# Patient Record
Sex: MALE | URBAN - METROPOLITAN AREA
[De-identification: names, ages, dates, MRNs, and addresses within clinical notes are randomized per-mention and may not be internally consistent; named-entity substitution may affect disease eponyms.]

---

## 2018-08-15 ENCOUNTER — AMBULATORY SURGICAL CENTER (OUTPATIENT)
Dept: URBAN - METROPOLITAN AREA SURGERY 10 | Facility: SURGERY | Age: 43
End: 2018-08-15

## 2018-08-15 ENCOUNTER — OFFICE (OUTPATIENT)
Dept: URBAN - METROPOLITAN AREA PATHOLOGY 4 | Facility: PATHOLOGY | Age: 43
End: 2018-08-15

## 2018-08-15 DIAGNOSIS — K22.2 ESOPHAGEAL OBSTRUCTION: ICD-10-CM

## 2018-08-15 DIAGNOSIS — K21.0 GASTRO-ESOPHAGEAL REFLUX DISEASE WITH ESOPHAGITIS: ICD-10-CM

## 2018-08-15 DIAGNOSIS — R13.10 DYSPHAGIA, UNSPECIFIED: ICD-10-CM

## 2018-08-15 DIAGNOSIS — K29.70 GASTRITIS, UNSPECIFIED, WITHOUT BLEEDING: ICD-10-CM

## 2018-08-15 PROCEDURE — 43249 ESOPH EGD DILATION <30 MM: CPT | Performed by: INTERNAL MEDICINE

## 2018-08-15 PROCEDURE — 43239 EGD BIOPSY SINGLE/MULTIPLE: CPT | Mod: 59 | Performed by: INTERNAL MEDICINE

## 2018-08-15 PROCEDURE — 88305 TISSUE EXAM BY PATHOLOGIST: CPT | Performed by: INTERNAL MEDICINE

## 2025-01-29 LAB
BASOPHILS # BLD AUTO: 0.05 10*3/MM3 (ref 0–0.2)
BASOPHILS NFR BLD AUTO: 0.8 % (ref 0–1.5)
BILIRUB UR QL STRIP: NEGATIVE
CLARITY UR: CLEAR
COLOR UR: YELLOW
DEPRECATED RDW RBC AUTO: 42.5 FL (ref 37–54)
EOSINOPHIL # BLD AUTO: 0.13 10*3/MM3 (ref 0–0.4)
EOSINOPHIL NFR BLD AUTO: 2.1 % (ref 0.3–6.2)
ERYTHROCYTE [DISTWIDTH] IN BLOOD BY AUTOMATED COUNT: 12.4 % (ref 12.3–15.4)
GLUCOSE UR STRIP-MCNC: NEGATIVE MG/DL
HCT VFR BLD AUTO: 38.9 % (ref 37.5–51)
HGB BLD-MCNC: 13.3 G/DL (ref 13–17.7)
HGB UR QL STRIP.AUTO: NEGATIVE
IMM GRANULOCYTES # BLD AUTO: 0.02 10*3/MM3 (ref 0–0.05)
IMM GRANULOCYTES NFR BLD AUTO: 0.3 % (ref 0–0.5)
KETONES UR QL STRIP: NEGATIVE
LEUKOCYTE ESTERASE UR QL STRIP.AUTO: NEGATIVE
LYMPHOCYTES # BLD AUTO: 1.78 10*3/MM3 (ref 0.7–3.1)
LYMPHOCYTES NFR BLD AUTO: 28.4 % (ref 19.6–45.3)
MCH RBC QN AUTO: 32.4 PG (ref 26.6–33)
MCHC RBC AUTO-ENTMCNC: 34.2 G/DL (ref 31.5–35.7)
MCV RBC AUTO: 94.9 FL (ref 79–97)
MONOCYTES # BLD AUTO: 0.6 10*3/MM3 (ref 0.1–0.9)
MONOCYTES NFR BLD AUTO: 9.6 % (ref 5–12)
NEUTROPHILS NFR BLD AUTO: 3.68 10*3/MM3 (ref 1.7–7)
NEUTROPHILS NFR BLD AUTO: 58.8 % (ref 42.7–76)
NITRITE UR QL STRIP: NEGATIVE
NRBC BLD AUTO-RTO: 0 /100 WBC (ref 0–0.2)
PH UR STRIP.AUTO: 6 [PH] (ref 5–8)
PLATELET # BLD AUTO: 171 10*3/MM3 (ref 140–450)
PMV BLD AUTO: 10.1 FL (ref 6–12)
PROT UR QL STRIP: NEGATIVE
RBC # BLD AUTO: 4.1 10*6/MM3 (ref 4.14–5.8)
SP GR UR STRIP: <=1.005 (ref 1–1.03)
UROBILINOGEN UR QL STRIP: NORMAL
WBC NRBC COR # BLD AUTO: 6.26 10*3/MM3 (ref 3.4–10.8)

## 2025-01-29 PROCEDURE — 36415 COLL VENOUS BLD VENIPUNCTURE: CPT

## 2025-01-29 PROCEDURE — 80307 DRUG TEST PRSMV CHEM ANLYZR: CPT

## 2025-01-29 PROCEDURE — 82077 ASSAY SPEC XCP UR&BREATH IA: CPT

## 2025-01-29 PROCEDURE — 96365 THER/PROPH/DIAG IV INF INIT: CPT

## 2025-01-29 PROCEDURE — 99285 EMERGENCY DEPT VISIT HI MDM: CPT

## 2025-01-29 PROCEDURE — 80053 COMPREHEN METABOLIC PANEL: CPT

## 2025-01-29 PROCEDURE — 96375 TX/PRO/DX INJ NEW DRUG ADDON: CPT

## 2025-01-29 PROCEDURE — 81003 URINALYSIS AUTO W/O SCOPE: CPT

## 2025-01-29 PROCEDURE — 80074 ACUTE HEPATITIS PANEL: CPT | Performed by: PSYCHIATRY & NEUROLOGY

## 2025-01-29 PROCEDURE — 85025 COMPLETE CBC W/AUTO DIFF WBC: CPT

## 2025-01-30 ENCOUNTER — HOSPITAL ENCOUNTER (INPATIENT)
Facility: HOSPITAL | Age: 50
LOS: 3 days | Discharge: HOME | DRG: 881 | End: 2025-02-02
Attending: PSYCHIATRY & NEUROLOGY | Admitting: PSYCHIATRY & NEUROLOGY
Payer: COMMERCIAL

## 2025-01-30 ENCOUNTER — HOSPITAL ENCOUNTER (EMERGENCY)
Facility: HOSPITAL | Age: 50
Discharge: STILL ADMITTED OR ADMINISTRATIVE | DRG: 881 | End: 2025-01-30
Payer: COMMERCIAL

## 2025-01-30 VITALS
WEIGHT: 165 LBS | HEIGHT: 70 IN | HEART RATE: 94 BPM | TEMPERATURE: 97.9 F | OXYGEN SATURATION: 95 % | BODY MASS INDEX: 23.62 KG/M2 | DIASTOLIC BLOOD PRESSURE: 88 MMHG | SYSTOLIC BLOOD PRESSURE: 139 MMHG | RESPIRATION RATE: 18 BRPM

## 2025-01-30 DIAGNOSIS — F10.10 ALCOHOL ABUSE: Primary | ICD-10-CM

## 2025-01-30 PROBLEM — F10.20 ALCOHOL USE DISORDER, SEVERE, DEPENDENCE: Status: ACTIVE | Noted: 2025-01-30

## 2025-01-30 PROBLEM — F17.200 NICOTINE USE DISORDER: Status: ACTIVE | Noted: 2025-01-30

## 2025-01-30 PROBLEM — I10 HTN (HYPERTENSION): Status: ACTIVE | Noted: 2025-01-30

## 2025-01-30 LAB
ALBUMIN SERPL-MCNC: 4.6 G/DL (ref 3.5–5.2)
ALBUMIN/GLOB SERPL: 1.7 G/DL
ALP SERPL-CCNC: 64 U/L (ref 39–117)
ALT SERPL W P-5'-P-CCNC: 25 U/L (ref 1–41)
AMPHET+METHAMPHET UR QL: NEGATIVE
AMPHETAMINES UR QL: NEGATIVE
ANION GAP SERPL CALCULATED.3IONS-SCNC: 18.7 MMOL/L (ref 5–15)
AST SERPL-CCNC: 41 U/L (ref 1–40)
BARBITURATES UR QL SCN: NEGATIVE
BENZODIAZ UR QL SCN: NEGATIVE
BILIRUB SERPL-MCNC: 0.5 MG/DL (ref 0–1.2)
BUN SERPL-MCNC: 6 MG/DL (ref 6–20)
BUN/CREAT SERPL: 7 (ref 7–25)
BUPRENORPHINE SERPL-MCNC: NEGATIVE NG/ML
CALCIUM SPEC-SCNC: 9.3 MG/DL (ref 8.6–10.5)
CANNABINOIDS SERPL QL: NEGATIVE
CHLORIDE SERPL-SCNC: 94 MMOL/L (ref 98–107)
CO2 SERPL-SCNC: 21.3 MMOL/L (ref 22–29)
COCAINE UR QL: NEGATIVE
CREAT SERPL-MCNC: 0.86 MG/DL (ref 0.76–1.27)
EGFRCR SERPLBLD CKD-EPI 2021: 106.8 ML/MIN/1.73
ETHANOL BLD-MCNC: 120 MG/DL (ref 0–10)
ETHANOL BLD-MCNC: 227 MG/DL (ref 0–10)
ETHANOL BLD-MCNC: 88 MG/DL (ref 0–10)
ETHANOL UR QL: 0.09 %
ETHANOL UR QL: 0.12 %
ETHANOL UR QL: 0.23 %
FENTANYL UR-MCNC: NEGATIVE NG/ML
GEN 5 1HR TROPONIN T REFLEX: 7 NG/L
GLOBULIN UR ELPH-MCNC: 2.7 GM/DL
GLUCOSE SERPL-MCNC: 108 MG/DL (ref 65–99)
HAV IGM SERPL QL IA: NORMAL
HBV CORE IGM SERPL QL IA: NORMAL
HBV SURFACE AG SERPL QL IA: NORMAL
HCV AB SER QL: NORMAL
HOLD SPECIMEN: NORMAL
HOLD SPECIMEN: NORMAL
MAGNESIUM SERPL-MCNC: 2.2 MG/DL (ref 1.6–2.6)
METHADONE UR QL SCN: NEGATIVE
OPIATES UR QL: NEGATIVE
OXYCODONE UR QL SCN: NEGATIVE
PCP UR QL SCN: NEGATIVE
POTASSIUM SERPL-SCNC: 3.7 MMOL/L (ref 3.5–5.2)
PROT SERPL-MCNC: 7.3 G/DL (ref 6–8.5)
QT INTERVAL: 364 MS
QTC INTERVAL: 457 MS
SODIUM SERPL-SCNC: 134 MMOL/L (ref 136–145)
TRICYCLICS UR QL SCN: NEGATIVE
TROPONIN T NUMERIC DELTA: -2 NG/L
TROPONIN T SERPL HS-MCNC: 9 NG/L
WHOLE BLOOD HOLD COAG: NORMAL
WHOLE BLOOD HOLD SPECIMEN: NORMAL

## 2025-01-30 PROCEDURE — 93010 ELECTROCARDIOGRAM REPORT: CPT | Performed by: INTERNAL MEDICINE

## 2025-01-30 PROCEDURE — 82077 ASSAY SPEC XCP UR&BREATH IA: CPT | Performed by: NURSE PRACTITIONER

## 2025-01-30 PROCEDURE — 36415 COLL VENOUS BLD VENIPUNCTURE: CPT | Performed by: NURSE PRACTITIONER

## 2025-01-30 PROCEDURE — 63710000001 ONDANSETRON ODT 4 MG TABLET DISPERSIBLE: Performed by: PSYCHIATRY & NEUROLOGY

## 2025-01-30 PROCEDURE — 25010000002 THIAMINE HCL 200 MG/2ML SOLUTION: Performed by: NURSE PRACTITIONER

## 2025-01-30 PROCEDURE — 99222 1ST HOSP IP/OBS MODERATE 55: CPT | Performed by: PSYCHIATRY & NEUROLOGY

## 2025-01-30 PROCEDURE — 25810000003 SODIUM CHLORIDE 0.9 % SOLUTION: Performed by: NURSE PRACTITIONER

## 2025-01-30 PROCEDURE — 96375 TX/PRO/DX INJ NEW DRUG ADDON: CPT

## 2025-01-30 PROCEDURE — 83735 ASSAY OF MAGNESIUM: CPT

## 2025-01-30 PROCEDURE — 84484 ASSAY OF TROPONIN QUANT: CPT | Performed by: PSYCHIATRY & NEUROLOGY

## 2025-01-30 PROCEDURE — 96365 THER/PROPH/DIAG IV INF INIT: CPT

## 2025-01-30 PROCEDURE — 93005 ELECTROCARDIOGRAM TRACING: CPT | Performed by: PSYCHIATRY & NEUROLOGY

## 2025-01-30 RX ORDER — SODIUM CHLORIDE 9 MG/ML
1000 INJECTION, SOLUTION INTRAVENOUS ONCE
Status: COMPLETED | OUTPATIENT
Start: 2025-01-30 | End: 2025-01-30

## 2025-01-30 RX ORDER — IBUPROFEN 400 MG/1
400 TABLET, FILM COATED ORAL EVERY 6 HOURS PRN
Status: DISCONTINUED | OUTPATIENT
Start: 2025-01-30 | End: 2025-02-02 | Stop reason: HOSPADM

## 2025-01-30 RX ORDER — CHLORDIAZEPOXIDE HYDROCHLORIDE 10 MG/1
10 CAPSULE, GELATIN COATED ORAL ONCE
Status: COMPLETED | OUTPATIENT
Start: 2025-02-02 | End: 2025-02-02

## 2025-01-30 RX ORDER — BENZONATATE 100 MG/1
100 CAPSULE ORAL 3 TIMES DAILY PRN
Status: DISCONTINUED | OUTPATIENT
Start: 2025-01-30 | End: 2025-02-02 | Stop reason: HOSPADM

## 2025-01-30 RX ORDER — CHLORDIAZEPOXIDE HYDROCHLORIDE 25 MG/1
25 CAPSULE, GELATIN COATED ORAL 2 TIMES DAILY
Status: COMPLETED | OUTPATIENT
Start: 2025-01-31 | End: 2025-01-31

## 2025-01-30 RX ORDER — FAMOTIDINE 20 MG/1
20 TABLET, FILM COATED ORAL 2 TIMES DAILY PRN
Status: DISCONTINUED | OUTPATIENT
Start: 2025-01-30 | End: 2025-02-02 | Stop reason: HOSPADM

## 2025-01-30 RX ORDER — BENZTROPINE MESYLATE 1 MG/ML
1 INJECTION, SOLUTION INTRAMUSCULAR; INTRAVENOUS ONCE AS NEEDED
Status: DISCONTINUED | OUTPATIENT
Start: 2025-01-30 | End: 2025-02-02 | Stop reason: HOSPADM

## 2025-01-30 RX ORDER — CHLORDIAZEPOXIDE HYDROCHLORIDE 25 MG/1
50 CAPSULE, GELATIN COATED ORAL 3 TIMES DAILY PRN
Status: DISPENSED | OUTPATIENT
Start: 2025-01-30 | End: 2025-01-31

## 2025-01-30 RX ORDER — ECHINACEA PURPUREA EXTRACT 125 MG
2 TABLET ORAL AS NEEDED
Status: DISCONTINUED | OUTPATIENT
Start: 2025-01-30 | End: 2025-02-02 | Stop reason: HOSPADM

## 2025-01-30 RX ORDER — BENZTROPINE MESYLATE 1 MG/1
2 TABLET ORAL ONCE AS NEEDED
Status: DISCONTINUED | OUTPATIENT
Start: 2025-01-30 | End: 2025-02-02 | Stop reason: HOSPADM

## 2025-01-30 RX ORDER — SODIUM CHLORIDE 0.9 % (FLUSH) 0.9 %
10 SYRINGE (ML) INJECTION AS NEEDED
Status: DISCONTINUED | OUTPATIENT
Start: 2025-01-30 | End: 2025-01-30 | Stop reason: HOSPADM

## 2025-01-30 RX ORDER — CHLORDIAZEPOXIDE HYDROCHLORIDE 10 MG/1
10 CAPSULE, GELATIN COATED ORAL 2 TIMES DAILY
Status: COMPLETED | OUTPATIENT
Start: 2025-02-01 | End: 2025-02-01

## 2025-01-30 RX ORDER — ALUMINA, MAGNESIA, AND SIMETHICONE 2400; 2400; 240 MG/30ML; MG/30ML; MG/30ML
15 SUSPENSION ORAL EVERY 6 HOURS PRN
Status: DISCONTINUED | OUTPATIENT
Start: 2025-01-30 | End: 2025-02-02 | Stop reason: HOSPADM

## 2025-01-30 RX ORDER — CHLORDIAZEPOXIDE HYDROCHLORIDE 25 MG/1
25 CAPSULE, GELATIN COATED ORAL EVERY 8 HOURS SCHEDULED
Status: COMPLETED | OUTPATIENT
Start: 2025-01-30 | End: 2025-01-30

## 2025-01-30 RX ORDER — ONDANSETRON 4 MG/1
4 TABLET, ORALLY DISINTEGRATING ORAL EVERY 6 HOURS PRN
Status: DISCONTINUED | OUTPATIENT
Start: 2025-01-30 | End: 2025-02-02 | Stop reason: HOSPADM

## 2025-01-30 RX ORDER — POLYETHYLENE GLYCOL 3350 17 G/17G
17 POWDER, FOR SOLUTION ORAL DAILY PRN
Status: DISCONTINUED | OUTPATIENT
Start: 2025-01-30 | End: 2025-02-02 | Stop reason: HOSPADM

## 2025-01-30 RX ORDER — TRAZODONE HYDROCHLORIDE 50 MG/1
50 TABLET, FILM COATED ORAL NIGHTLY PRN
Status: DISCONTINUED | OUTPATIENT
Start: 2025-01-30 | End: 2025-02-02 | Stop reason: HOSPADM

## 2025-01-30 RX ORDER — THIAMINE HYDROCHLORIDE 100 MG/ML
200 INJECTION, SOLUTION INTRAMUSCULAR; INTRAVENOUS ONCE
Status: COMPLETED | OUTPATIENT
Start: 2025-01-30 | End: 2025-01-30

## 2025-01-30 RX ORDER — CHLORDIAZEPOXIDE HYDROCHLORIDE 25 MG/1
50 CAPSULE, GELATIN COATED ORAL ONCE
Status: COMPLETED | OUTPATIENT
Start: 2025-01-30 | End: 2025-01-30

## 2025-01-30 RX ORDER — LOPERAMIDE HYDROCHLORIDE 2 MG/1
2 CAPSULE ORAL
Status: DISCONTINUED | OUTPATIENT
Start: 2025-01-30 | End: 2025-02-02 | Stop reason: HOSPADM

## 2025-01-30 RX ORDER — MULTIPLE VITAMINS W/ MINERALS TAB 9MG-400MCG
1 TAB ORAL DAILY
Status: DISCONTINUED | OUTPATIENT
Start: 2025-01-30 | End: 2025-02-02 | Stop reason: HOSPADM

## 2025-01-30 RX ORDER — MULTIVITAMIN WITH IRON
2 TABLET ORAL DAILY
Status: DISCONTINUED | OUTPATIENT
Start: 2025-01-30 | End: 2025-02-02 | Stop reason: HOSPADM

## 2025-01-30 RX ORDER — HYDROXYZINE HYDROCHLORIDE 50 MG/1
50 TABLET, FILM COATED ORAL EVERY 6 HOURS PRN
Status: DISCONTINUED | OUTPATIENT
Start: 2025-01-30 | End: 2025-02-02 | Stop reason: HOSPADM

## 2025-01-30 RX ADMIN — CHLORDIAZEPOXIDE HYDROCHLORIDE 25 MG: 25 CAPSULE ORAL at 15:25

## 2025-01-30 RX ADMIN — CHLORDIAZEPOXIDE HYDROCHLORIDE 25 MG: 25 CAPSULE ORAL at 09:36

## 2025-01-30 RX ADMIN — SODIUM CHLORIDE 1000 ML: 9 INJECTION, SOLUTION INTRAVENOUS at 01:04

## 2025-01-30 RX ADMIN — CHLORDIAZEPOXIDE HYDROCHLORIDE 50 MG: 25 CAPSULE ORAL at 13:58

## 2025-01-30 RX ADMIN — HYDROXYZINE HYDROCHLORIDE 50 MG: 50 TABLET, FILM COATED ORAL at 09:36

## 2025-01-30 RX ADMIN — FOLIC ACID 1 MG: 5 INJECTION, SOLUTION INTRAMUSCULAR; INTRAVENOUS; SUBCUTANEOUS at 01:59

## 2025-01-30 RX ADMIN — THIAMINE HYDROCHLORIDE 200 MG: 100 INJECTION, SOLUTION INTRAMUSCULAR; INTRAVENOUS at 01:05

## 2025-01-30 RX ADMIN — ONDANSETRON 4 MG: 4 TABLET, ORALLY DISINTEGRATING ORAL at 09:36

## 2025-01-30 RX ADMIN — CHLORDIAZEPOXIDE HYDROCHLORIDE 25 MG: 25 CAPSULE ORAL at 21:17

## 2025-01-30 RX ADMIN — CHLORDIAZEPOXIDE HYDROCHLORIDE 50 MG: 25 CAPSULE ORAL at 05:10

## 2025-01-30 RX ADMIN — HYDROXYZINE HYDROCHLORIDE 50 MG: 50 TABLET, FILM COATED ORAL at 21:17

## 2025-01-30 NOTE — ED NOTES
MEDICAL SCREENING:    Reason for Visit: ETOH detox    Patient initially seen in triage.  The patient was advised further evaluation and diagnostic testing will be needed, some of the treatment and testing will be initiated in the lobby in order to begin the process.  The patient will be returned to the waiting area for the time being and possibly be re-assessed by a subsequent ED provider.  The patient will be brought back to the treatment area in as timely manner as possible.      Shaila Cerda APRN  01/29/25 [id number]       Aleksey Mcintyre MD  01/30/25 5815

## 2025-01-30 NOTE — PLAN OF CARE
Goal Outcome Evaluation:        Problem: Adult Behavioral Health Plan of Care  Goal: Patient-Specific Goal (Individualization)  Outcome: Progressing  Flowsheets  Taken 1/30/2025 0956  Patient/Family-Specific Goals (Include Timeframe): Patient will identify 2-3 coping skills, complete aftercare plans, address relapse prevention methods, and deny SI/HI prior to discharge in 1-7 days. Patient's long term goal is to maintain sobriety for the next 30 days.  Individualized Care Needs: Therapist to offer 1-4 therapy sessions, aftercare planning, safety planning, group therapy, family education, and brief CBT/MI interventions.  Anxieties, Fears or Concerns: none verbalized  Taken 1/30/2025 0947  Patient Personal Strengths:   resilient   resourceful   motivated for treatment   motivated for recovery   family/social support   socioeconomic stability   positive vocational history   positive educational history   stable living environment   relationship stability  Patient Vulnerabilities:   substance abuse/addiction   poor impulse control   history of unsuccessful treatment  Goal: Optimized Coping Skills in Response to Life Stressors  Outcome: Progressing  Intervention: Promote Effective Coping Strategies  Flowsheets (Taken 1/30/2025 0956)  Supportive Measures:   active listening utilized   counseling provided   decision-making supported   goal-setting facilitated   verbalization of feelings encouraged   self-responsibility promoted   self-reflection promoted   self-care encouraged   relaxation techniques promoted   positive reinforcement provided  Goal: Develops/Participates in Therapeutic [place] to Support Successful Transition  Outcome: Progressing  Intervention: Foster Therapeutic [place]den  Flowsheets (Taken 1/30/2025 0956)  Trust Relationship/Rapport:   care explained   questions encouraged   choices provided   reassurance provided   emotional support provided   thoughts/feelings acknowledged   empathic listening  provided   questions answered  Intervention: Mutually Develop Transition Plan  Flowsheets  Taken 1/30/2025 0958  Outpatient/Agency/Support Group Needs:   residential services   outpatient substance abuse treatment (specify)   outpatient psychiatric care (specify)   outpatient medication management   outpatient counseling   intensive outpatient services  Transition Support:   follow-up care discussed   follow-up care coordinated   community resources reviewed   crisis management plan promoted   crisis management plan verbalized  Anticipated Discharge Disposition: home with family  Taken 1/30/2025 0949  Discharge Coordination/Progress: Patient has [employer] insurance. Therapist met with patient to complete assessment.  Transportation Anticipated: family or friend will provide  Transportation Concerns: none  Current Discharge Risk: substance use/abuse  Concerns to be Addressed:   substance/tobacco abuse/use   coping/stress   cognitive/perceptual   mental health   discharge planning  Readmission Within the Last 30 Days: no previous admission in last 30 days  Patient/Family Anticipated Services at Transition:   outpatient care   mental health services  Patient's Choice of Community Agency(s): To be determined.  Patient/Family Anticipates Transition to: home  Offered/Gave Vendor List: yes      DATA:      Therapist met individually with patient this date to introduce role and to discuss hospitalization expectations. Patient agreeable.     Consent in chartlet for girlfriend and sons.    Patient not agreeable to aftercare, worried about cost of services and copays.     Clinical Maneuvering/Intervention:     Therapist assisted patient in processing session content; acknowledged and normalized patient’s thoughts, feelings, and concerns. Discussed the therapist/patient relationship and explain the parameters and limitations of relative confidentiality. Also discussed the importance of active participation, and honesty to the  treatment process. Encouraged the patient to discuss/vent their feelings, frustrations, and fears concerning their ongoing medical issues and validated their feelings.     Discussed the importance of finding enjoyable activities and coping skills that the patient can engage in a regular basis. Discussed healthy coping skills such as distraction, self love, grounding, thought challenges/reframing, etc. Provided patient with list of healthy coping skills this date. Discussed the importance of medication compliance. Praised the patient for seeking help and spent the majority of the session building rapport.       Allowed patient to freely discuss issues without interruption or judgment. Provided safe, confidential environment to facilitate the development of positive therapeutic relationship and encourage open, honest communication.      Therapist addressed discharge safety planning this date. Assisted patient in identifying risk factors which would indicate the need for higher level of care after discharge; including thoughts to harm self or others and/or self-harming behavior. Encouraged patient to call 911, or present to the nearest emergency room should any of these events occur. Discussed crisis intervention services and means to access. Encouraged securing any objects of harm.       Therapist completed integrated summary, treatment plan, and initiated social history this date. Therapist is strongly encouraging family involvement in treatment.       ASSESSMENT:      The patient is a 48 y/o male admitted for alcohol detox treatment. Therapist met with patient for approximately 30 minutes on this date to complete assessment. Patient denies SI/HI/AVH, reports experiencing tremors and fatigue. Patient has had no past [facility] admissions. Patient reports drinking did not become a problem for him until about age 41, longest period of sobriety has been two weeks. Patient denies history of physical, emotional, or  sexual abuse. Patient lives at home with his girlfriend and works full time in IT. He plans to return home when stable and is not agreeable to aftercare being arranged at this time. Patient discussed problems with his ex wife and stress from work as primary stressors. He reports his ex wife moved with two of their children to [place] and he has not been able to see them in a long time, still regularly sees one of his sons who is 25. Patient is agreeable to his girlfriend and songs being involved in his treatment. Family expected to provide transportation home at discharge. He denies having any additional needs or concerns.      PLAN:       Patient to remain hospitalized this date.     Treatment team will focus efforts on stabilizing patient's acute symptoms while providing education on healthy coping and crisis management to reduce hospitalizations. Patient requires daily psychiatrist evaluation and 24/7 nursing supervision to promote patient safety.     Therapist will offer 1-4 individual sessions, 1 therapy group daily, family education, and appropriate referral.    Therapist recommends [facility] residential rehab.

## 2025-01-30 NOTE — NURSING NOTE
Patient reports to intake and reports that he has been a drinker for many years since the age of 41 but has been drinking steadily for 3 yrs. He relates his drinking problem to stress and anxiety and issues with an ex wife to his reason for starting drinking and states that he has bad anxiety issues and now his drinking has just gotten out of hand and he needs to quit before it ruins his life. He states he has a girlfriend of two yrs and a job where he has been at 12 yrs and family and does not want to loose all that. And wants to quit drinking.     Last drink was yesterday. He states that he got home from work and called his girlfriend and told her he was tired of feeling this way.sick is he does not get a drink.         Initial etoh level  at 2323 was 227     Last etoh level was 88 at 0609..     Patient reports drinking 6 to 12 pints of beer aday and some times a shot of whiskey.    CIWA 8. Patient received librium 50 mg po at 0550 this am. He states that he was feeling pretty shaky before they gave him the medicine.     Patient denies SI HI or AVH    He denies any previous tx history for his addiction problem.

## 2025-01-30 NOTE — ED PROVIDER NOTES
Subjective   History of Present Illness  Patient is a 49-year-old male with past medical history significant for anxiety, depression, hypertension and alcohol abuse.  He presents to the ED today for alcohol detox.  He states that he drinks 12 nights of liquor and of beer daily.  Denies SI or HI.        Review of Systems   Constitutional: Negative.  Negative for fever.   HENT: Negative.     Eyes: Negative.    Respiratory: Negative.     Cardiovascular: Negative.  Negative for chest pain.   Gastrointestinal: Negative.  Negative for abdominal pain.   Endocrine: Negative.    Genitourinary: Negative.  Negative for dysuria.   Skin: Negative.    Allergic/Immunologic: Negative.    Neurological: Negative.    Psychiatric/Behavioral: Negative.     All other systems reviewed and are negative.      Past Medical History:   Diagnosis Date    Alcohol abuse     Anxiety     HLD (hyperlipidemia)     HTN (hypertension)        No Known Allergies    Past Surgical History:   Procedure Laterality Date    HERNIA REPAIR         No family history on file.    Social History     Socioeconomic History    Marital status: Single   Tobacco Use    Smoking status: Every Day     Average packs/day: 1 pack/day for 30.0 years (30.0 ttl pk-yrs)     Types: Cigarettes    Smokeless tobacco: Current     Types: Snuff   Vaping Use    Vaping status: Some Days    Substances: Nicotine, Flavoring    Devices: Disposable   Substance and Sexual Activity    Alcohol use: Yes    Drug use: Yes     Comment: etoh    Sexual activity: Yes     Partners: Female     Birth control/protection: None           Objective   Physical Exam  Vitals and nursing note reviewed.   Constitutional:       General: He is not in acute distress.     Appearance: He is well-developed. He is not diaphoretic.   HENT:      Head: Normocephalic and atraumatic.      Right Ear: External ear normal.      Left Ear: External ear normal.      Nose: Nose normal.   Eyes:      Conjunctiva/sclera: Conjunctivae  normal.      Pupils: Pupils are equal, round, and reactive to light.   Neck:      Vascular: No JVD.      Trachea: No tracheal deviation.   Cardiovascular:      Rate and Rhythm: Normal rate and regular rhythm.      Heart sounds: Normal heart sounds. No murmur heard.  Pulmonary:      Effort: Pulmonary effort is normal. No respiratory distress.      Breath sounds: Normal breath sounds. No wheezing.   Abdominal:      General: Bowel sounds are normal.      Palpations: Abdomen is soft.      Tenderness: There is no abdominal tenderness.   Musculoskeletal:         General: No deformity. Normal range of motion.      Cervical back: Normal range of motion and neck supple.   Skin:     General: Skin is warm and dry.      Coloration: Skin is not pale.      Findings: No erythema or rash.   Neurological:      Mental Status: He is alert and oriented to person, place, and time.      Cranial Nerves: No cranial nerve deficit.   Psychiatric:         Behavior: Behavior normal.         Thought Content: Thought content normal.         Procedures           ED Course                                                       Medical Decision Making  Problems Addressed:  Alcohol abuse: complicated acute illness or injury    Amount and/or Complexity of Data Reviewed  Labs: ordered.    Risk  Prescription drug management.        Final diagnoses:   Alcohol abuse       ED Disposition  ED Disposition       ED Disposition   DC/Transfer to Behavioral Health Condition   Stable    Comment   --               No follow-up provider specified.       Medication List      No changes were made to your prescriptions during this visit.            Shaila Cerda APRN  02/03/25 0109       Shaila Cerda APRN  02/03/25 0109

## 2025-01-30 NOTE — NURSING NOTE
Spoke to  via phone. Intake information provided. Instructed to admit the patient. And do the librium detox for now. Admit orders received. RBVOx2.. Patient and ed provider made aware of plan of care. Safety precautions maintained.

## 2025-01-30 NOTE — PAYOR COMM NOTE
"Reji Mooney (49 y.o. Male)       Date of Birth   1975    Social Security Number       Address   [address] [place] [address]    Home Phone   [phone]    MRN   [id number]       Voodoo   None    Marital Status   Single                            Admission Date   1/30/25    Admission Type   Emergency    Admitting Provider   Melanie Sanchez MD    Attending Provider   Melanie Sanchez MD    Department, Room/Bed   [facility] ADULT CD, 1044/1S       Discharge Date       Discharge Disposition       Discharge Destination                                 Attending Provider: Melanie Sanchez MD    Allergies: No Known Allergies    Isolation: None   Infection: None   Code Status: CPR    Ht: 177.8 cm (70\")   Wt: 74.1 kg (163 lb 6.4 oz)    Admission Cmt: None   Principal Problem: Alcohol abuse [F10.10]                   Active Insurance as of 1/30/2025       Primary Coverage       Payor Plan Insurance Group Employer/Plan Group    [facility] [id number]       Payor Plan Address Payor Plan Phone Number Payor Plan Fax Number Effective Dates    [address] [address] [phone]  1/31/2022 - None Entered    [facility] [id number]         Subscriber Name Subscriber Birth Date Member ID       REJI MOONEY 1975 [id number]                     Emergency Contacts        (Rel.) Home Phone Work Phone Mobile Phone    PEGGY REYES (Significant Other) [phone] -- --          RETURN FAX:  [phone]    PLEASE ATTACH THIS NEW INPATIENT BEHAVIORAL HEALTH DETOX (ASAM 4.0)  (REV CODE 126)  AUTHORIZATION REQUEST TO REFERENCE # [id number]    ADMISSION DATE:  1/30/2025 AT 0808 ET (ADMITTED FROM THE EMERGENCY DEPARTMENT)    DIAGNOSIS:  ALCOHOL USE DISORDER, SEVERE, DEPENDENCE (F10.20)  ESTIMATED LENGTH OF STAY IS 5 DAYS    FACILITY:  [facility] (NPI [id number])  ADDRESS:  [address] [place] [address]  ATTENDING MD:  DR MELANIE SANCHEZ (NPI [id number])  ADDRESS IS SAME AS " FACILITY    UR CONTACT:  TERESA YAÑEZ RN  PHONE:  [phone]  FAX:  [phone]       TOX, BLOOD    Ethanol %   0.227  0.120 0.088   Ethanol %     Ethanol   227  120 88   Ethanol     TOX, URINE    Amphetamine, Urine Qual  Negative       Amphetamine, Urine Qual     Barbiturates Screen, Urine  Negative       Barbiturates Screen, Urine     Benzodiazepine Screen, Urine  Negative       Benzodiazepine Screen, Urine     Buprenorphine, Screen, Urine  Negative       Buprenorphine, Screen, Urine     Cocaine Screen, Urine  Negative       Cocaine Screen, Urine     Fentanyl, Urine  Negative       Fentanyl, Urine     Methamphetamine, Ur  Negative       Methamphetamine, Ur     Methadone Screen , Urine  Negative       Methadone Screen , Urine     Opiate Screen  Negative       Opiate Screen     Oxycodone Screen, Urine  Negative       Oxycodone Screen, Urine     Phencyclidine (PCP), Urine  Negative       Phencyclidine (PCP), Urine     THC Screen, Urine  Negative       THC Screen, Urine     Tricyclic Antidepressants Screen  Negative       Tricyclic Antidepressants Screen     SENDOUT GENERAL LAB      Fentanyl, Urine  Negative     AST (SGOT)   41     Row Name 01/30/25 0215 01/30/25 0200 01/30/25 0145 01/30/25 0130 01/29/25 2226   Vital Signs   Temp -- -- -- -- 98.5 °F (36.9 °C)   Temp src -- -- -- -- Oral   Pulse 82 78 73 80 94   Heart Rate Source -- -- -- -- Monitor   Resp -- -- -- -- 18   Resp Rate Source -- -- -- -- Visual   /82 129/89 122/83 134/83 153/98     Row Name 01/30/25 0852 01/30/25 0818 01/30/25 0720 01/30/25 0630 01/30/25 0600   Vital Signs   Temp -- 98.4 °F (36.9 °C) 97.9 °F (36.6 °C) -- --   Temp src -- Temporal Temporal -- --   Pulse -- 87 94 75 75   Heart Rate Source -- Monitor Monitor -- --   Resp -- 18 -- -- --   Resp Rate Source -- Visual -- -- --   BP -- 151/92 139/88 133/89 127/84   Noninvasive MAP (mmHg) -- -- -- 108 102     Medical History    Past Medical History    Diagnosis Date Comments   HTN  "(hypertension) [I10]     Alcohol abuse [F10.10]     Anxiety [F41.9]     HLD (hyperlipidemia) [E78.5]       Past Surgical History   Laterality Date Comments   Hernia repair [SHX51]        ED Medication Orders    Hide(From admission, onward)  Start Ordered   Status Ordering Provider   01/30/25 0519 01/30/25 0503  chlordiazePOXIDE (LIBRIUM) capsule 50 mg  Once         Last MAR action: Given BARBARA VUN   01/30/25 0019 01/30/25 0003  sodium chloride 0.9 % infusion 1,000 mL  (Banana Bag (thiamine 200 mg, folic acid 1 mg in normal saline 1000 mL)  Once         Last MAR action: Stopped BARBARA VUN   01/30/25 0019 01/30/25 0003  folic acid 1 mg in sodium chloride 0.9 % 50 mL IVPB  (Banana Bag (thiamine 200 mg, folic acid 1 mg in normal saline 1000 mL)  Once         Last MAR action: Stopped HORACE MONICA   01/30/25 0019 01/30/25 0003  thiamine (B-1) injection 200 mg  (Banana Bag (thiamine 200 mg, folic acid 1 mg in normal saline 1000 mL)  Once         Last MAR action: Given BARBARA VUN   01/30/25 0002 01/30/25 0003    As Needed        \"And\" Linked Group Details    Discontinued      Current Scheduled Medications  Collapse  Hide  (From now, onward)  Start   Ordered Stop   02/02/25 0900  chlordiazePOXIDE (LIBRIUM) capsule 10 mg  10 mg,   Oral,   Once        References:    [facility]omp    Pediatrics    01/30/25 0740 --   02/01/25 0900  chlordiazePOXIDE (LIBRIUM) capsule 10 mg  10 mg,   Oral,   2 Times Daily        References:    [facility]    Pediatrics    01/30/25 0740 02/02/25 0859   01/31/25 0900  chlordiazePOXIDE (LIBRIUM) capsule 25 mg  25 mg,   Oral,   2 Times Daily        References:    [facility]    Pediatrics    01/30/25 0740 02/01/25 0859   01/30/25 0930  B-complex with vitamin C tablet 2 tablet  2 tablet,   Oral,   Daily        References:    [facility]    Pediatrics    01/30/25 0832 --   01/30/25 0930  thiamine (VITAMIN B-1) tablet 100 mg  100 mg,   Oral,   Daily        References:    [facility]    Pediatrics    01/30/25 0832 " --   25  multivitamin with minerals 1 tablet  1 tablet,   Oral,   Daily        References:    [facility]    Pediatrics    25 --   25  chlordiazePOXIDE (LIBRIUM) capsule 25 mg  25 mg,   Oral,   Every 8 Hours Scheduled        References:    Lexicomp    Pediatrics             PRN MEDICATIONS UTILIZED:  hydrOXYzine (ATARAX) tablet 50 mg  Dose: 50 mg  Freq: Every 6 Hours PRN Route: PO  PRN Reason: Anxiety  Start: 2532  RECEIVED 25 AT 0936  ondansetron ODT (ZOFRAN-ODT) disintegrating tablet 4 mg  Dose: 4 mg  Freq: Every 6 Hours PRN Route: TL  PRN Reasons: Nausea,Vomiting  Start: 25  RECEIVED 25 AT 0936    H&P:   Melanie Miller MD   Physician  Psychiatry     H&P      Signed     Date of Service: 25 115  Creation Time: 25     Signed       Expand All Collapse All            INITIAL PSYCHIATRIC HISTORY & PHYSICAL     Patient Identification:  Name:  Ron Durbin  Age:  49 y.o.  Sex:  male  :  1975  MRN:  [id number]   Visit Number:  [id number]  Primary Care Physician:  Kristi Conti APRN     SUBJECTIVE     CC/Focus of Exam: Alcohol use and withdrawals     HPI: Ron Durbin is a 49 y.o. male who was admitted on 2025 with complaints of alcohol use and withdrawals. The patient reports a long history of substance use. First use was at age 23 but didn't regularly and at age 41 he started drinking regularly to deal with the stressors of life. Over time the use increased and the patient  continued to use despite negative consequences including relationship problems, social and financial problems. The patient endorses symptoms of tolerance and withdrawals and ongoing cravings to use. Has tried to cut down and stop but has not been successful. Spends too much time and resources in pursuit of substance use. Longest period of sobriety is reported to be a week.  Currently using 6-10 pints of beer daily and then some more.   Last use was yesterday.    Withdrawal symptoms include tremors, sweating, anxiety attacks. The patient was experiencing a lot of withdrawals in the ED and needed a dose of Librium which helped.      PAST PSYCHIATRIC HX: Patient reports he took Wellbutrin recently but it didn't help.      SUBSTANCE USE HX: See HPI.      SOCIAL HX:   Social History   Social History            Socioeconomic History    Marital status: Single   Tobacco Use    Smoking status: Every Day       Average packs/day: 1 pack/day for 30.0 years (30.0 ttl pk-yrs)       Types: Cigarettes    Smokeless tobacco: Current       Types: Snuff   Vaping Use    Vaping status: Some Days    Substances: Nicotine, Flavoring    Devices: Disposable   Substance and Sexual Activity    Alcohol use: Yes    Drug use: Yes       Comment: etoh    Sexual activity: Yes       Partners: Female       Birth control/protection: None               Medical History        Past Medical History:   Diagnosis Date    Alcohol abuse      Anxiety      HLD (hyperlipidemia)      HTN (hypertension)                 Surgical History[]Expand by Default         Past Surgical History:   Procedure Laterality Date    HERNIA REPAIR                History reviewed. No pertinent family history.        Prescriptions Prior to Admission   No medications prior to admission.               ALLERGIES:  Patient has no known allergies.     Temp:  [97.9 °F (36.6 °C)-98.5 °F (36.9 °C)] 98.4 °F (36.9 °C)  Heart Rate:  [73-94] 87  Resp:  [18] 18  BP: (120-153)/(80-98) 151/92     REVIEW OF SYSTEMS:  Review of Systems   Constitutional:  Positive for diaphoresis and fatigue.   HENT: Negative.     Eyes: Negative.    Respiratory: Negative.     Cardiovascular: Negative.    Gastrointestinal:  Positive for nausea.   Endocrine: Negative.    Genitourinary: Negative.    Musculoskeletal: Negative.    Skin: Negative.    Allergic/Immunologic: Negative.    Neurological:  Positive for tremors and weakness.   Hematological: Negative.     Psychiatric/Behavioral:  The patient is nervous/anxious.          OBJECTIVE    PHYSICAL EXAM:  Physical Exam  Constitutional:  Appears well-developed and well-nourished.   HENT:   Head: Normocephalic and atraumatic.   Right Ear: External ear normal.   Left Ear: External ear normal.   Mouth/Throat: Oropharynx is clear and moist.   Eyes: Pupils are equal, round, and reactive to light. Conjunctivae and EOM are normal.   Neck: Normal range of motion. Neck supple.   Cardiovascular: Normal rate, regular rhythm and normal heart sounds.    Respiratory: Effort normal and breath sounds normal. No respiratory distress. No wheezes.   GI: Soft. Bowel sounds are normal.No distension. There is no tenderness.   Musculoskeletal: Normal range of motion. No edema or deformity.   Neurological:  Cranial Nerves: I. No anosmia. II: No visual disturbance. III, IV VI: EOMI, PERRLA. V: Corneal reflext intact, no abnormal sensations. VII: No facial palsy, or altered sensation. VIII: Hearing intact, balance intact. IX: Intact ah reflex. X: Normal phonation, swallowing. XI: Normal shrug and head movement. XII: Intact tongue movements  Coordination normal. No lateralizing signs.  Skin: Skin is warm and dry. No rash noted. No erythema.      MENTAL STATUS EXAM:   Hygiene:   fair  Cooperation:  Cooperative  Eye Contact:  Fair  Psychomotor Behavior:  Appropriate  Affect:  Appropriate  Hopelessness: Denies  Speech:  Normal  Thought Process: Goal directed  Thought Content:  Normal  Suicidal:  None  Homicidal:  None  Hallucinations:  None  Delusion:  None  Memory:  Intact  Orientation:  Person, Place, Time and Situation  Reliability:  fair  Insight:  Fair  Judgement:  Fair  Impulse Control:  Fair        Imaging Results (Last 24 Hours)         ** No results found for the last 24 hours. **                ECG/EMG Results (most recent)         Procedure Component Value Units Date/Time     ECG 12 Lead Other; Baseline Cardiac Status [[id number]] Collected:  01/30/25 1003       Updated: 01/30/25 1007       QT Interval 364 ms         QTC Interval 457 ms       Narrative:       Test Reason : Other~  Blood Pressure :   */*   mmHG  Vent. Rate :  95 BPM     Atrial Rate :  95 BPM     P-R Int : 142 ms          QRS Dur :  82 ms      QT Int : 364 ms       P-R-T Axes :  72  69  60 degrees    QTcB Int : 457 ms     Normal sinus rhythm  Normal ECG  No previous ECGs available     Referred By: DANIEL           Confirmed By:                       Lab Results   Component Value Date     GLUCOSE 108 (H) 01/29/2025     BUN 6 01/29/2025     CREATININE 0.86 01/29/2025     BCR 7.0 01/29/2025     CO2 21.3 (L) 01/29/2025     CALCIUM 9.3 01/29/2025     ALBUMIN 4.6 01/29/2025     AST 41 (H) 01/29/2025     ALT 25 01/29/2025               Lab Results   Component Value Date     WBC 6.26 01/29/2025     HGB 13.3 01/29/2025     HCT 38.9 01/29/2025     MCV 94.9 01/29/2025      01/29/2025         Last Urine Toxicity               Latest Ref Rng & Units 1/29/2025   LAST URINE TOXICITY RESULTS   Amphetamine, Urine Qual Negative Negative    Barbiturates Screen, Urine Negative Negative    Benzodiazepine Screen, Urine Negative Negative    Buprenorphine, Screen, Urine Negative Negative    Cocaine Screen, Urine Negative Negative    Fentanyl, Urine Negative Negative    Methadone Screen , Urine Negative Negative    Methamphetamine, Ur Negative Negative         Details                          Brief Urine Lab Results  (Last result in the past 365 days)          Color   Clarity   Blood   Leuk Est   Nitrite   Protein   CREAT   Urine HCG         01/29/25 2344 Yellow    Clear    Negative    Negative    Negative    Negative                                ASSESSMENT & PLAN:     Hospital bed: No       Alcohol use disorder, severe, dependence  -Ativan detox  -Thiamine and folate       HTN (hypertension)  -Lisinopril 40 mg daily       Nicotine use disorder  -Encouraged cessation        The patient has been admitted  for safety and stabilization.  Patient will be monitored for suicidality daily and maintained on Special Precautions Level 4 (q30 min checks).  The patient will have individual and group therapy with a master's level therapist. A master treatment plan will be developed and agreed upon by the patient and his/her treatment team.  The patient's estimated length of stay in the hospital is 5-7 days.                           INTAKE:       Abiola Mujica, RN   Registered Nurse  Nursing     Nursing Note      Signed     Date of Service: 01/30/25 0704  Creation Time: 01/30/25 0704   Related encounter: ED from 1/30/2025 in [facility] EMERGENCY DEPARTMENT with Aleksey Mcintyre MD     Signed         Patient reports to intake and reports that he has been a drinker for many years since the age of 41 but has been drinking steadily for 3 yrs. He relates his drinking problem to stress and anxiety and issues with an ex wife to his reason for starting drinking and states that he has bad anxiety issues and now his drinking has just gotten out of hand and he needs to quit before it ruins his life. He states he has a girlfriend of two yrs and a job where he has been at 12 yrs and family and does not want to loose all that. And wants to quit drinking.      Last drink was yesterday. He states that he got home from work and called his girlfriend and told her he was tired of feeling this way.sick is he does not get a drink.            Initial etoh level  at 2323 was 227      Last etoh level was 88 at 0609..      Patient reports drinking 6 to 12 pints of beer aday and some times a shot of whiskey.     CIWA 8. Patient received librium 50 mg po at 0550 this am. He states that he was feeling pretty shaky before they gave him the medicine.      Patient denies SI HI or AVH     He denies any previous tx history for his addiction problem.                     Intake Information - 01/30/25 0829    What problem (s) brought you here  "today? Request to detox alcohol - reports 6-10 of 16 oz cans of beer daily and an occasional vodka flavored shots once every week or two, but not on regular basis. Reports been drinking last 9 yrs.   Precipitating Factors \"started drinking to deal with depression of ex-wife cheating on me and a few other things going on in my life\".   Circumstances for Admission Psychological   Risk Behavior Self   Source of Information Patient   Referral Source MD;Other  KIMMY Conti [facility]     Contact Information - 01/30/25 0652    Consent to contact given for the following Other (comment)  genny ladd girlfriend     Medical/Surgical/Psychosocial History - 01/30/25 0653    Approximate date of last complete physical examination --  unknown   Do you believe that you need HIV testing? No   Do you believe that you need Hepatitis C testing? No   Have you done anything to injure or harm yourself today? No   Previous Mental Health Treatment none   Previous Substance Use Treatment none   Number of previous psychiatric admissions 0   Number of previous detox admissions 0     Family History - 01/30/25 0653    Marital status Single   Children? Yes  3   Who will be caring for minor children? their mother   Place of birth [facility]   Place raised [facility]   Parent Marital Status    Location of parents [facility]   Parents names/ages David and Jennifer   Number of siblings 2   Number of step/half siblings 0     Housing/Living Environment - 01/30/25 0654    Current Living Arrangements home   People in Home other (see comments)   How long have you lived with the others in your household? girlfriend genny. two years   Will your living arrangements affect your treatment/recovery? No   Do you need assistance with housing options? 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 Yes   Effect of patient's condition on family stress   Effect of family on patient's condition supportive of me getting help      - 01/30/25 0655    Has patient been in the ? No     Spiritual - 01/30/25 0655    Druze none   Are there spiritual concerns you feel need addressed during treatment? No   Spiritual care consult requested No     Sexual - 01/30/25 0655    Patient is in a monogamous relationship? Yes   Has patient ever been accused of inappropriate sexual behavior? No     Legal Issues - 01/30/25 0655    Has patient ever been arrested, charged or convicted of a crime? No   Does patient currently have any outstanding charges? No     Recreational - 01/30/25 0655    Additional activities I play music. I write music.   Patient tends to spend time With friends;With family   Patient's ability to be close to others has been affected by Alcohol     Current Stressors - 01/30/25 0656    Current stressors Other (comment)   Current stressor details my drinking has gotten out of control.   Will stressors affect your treatment success or possibly cause relapse if chemically dependent? No     Screenings - 01/30/25 0657    AUDIT-C (Alcohol Use Disorders Identification Test)   Q1: How often do you have a drink containing alcohol? 4 or more ti   Q2: How many drinks containing alcohol do you have on a typical day when you are drinking? 10 or more   Q3: How often do you have six or more drinks on one occasion? Daily   Audit-C Score 12   Clinical Opiate Withdrawal Scale   Resting Pulse Rate: Measured After Patient is Sitting or Lying for One Minute --  denies use   Clinical [place] Withdrawal Assessment of Alcohol, Revised   Nausea and Vomiting 1   Tactile Disturbances 0   Tremor 3   Auditory Disturbances 0   Paroxysmal Sweats 0    Visual Disturbances 0   Anxiety 1   Headache, Fullness in Head 2   Agitation 1   Orientation and Clouding of Sensorium 0   CIWA-Ar Total 8     Chemical Dependency Addendum - 01/30/25 0658    Patient feels alcohol/drug use is a problem for Himself/Herself   Patient reports others have expressed concern about patient's alcohol/drug use Yes   Patient's desired goal Abstinence   The amount required to get the desired effect has Increased   Memory loss/blackouts during use No   Has patient previously tried to quit/cut back? No   Stated reasons for beginning use of substances alot of it was problems with my ex wife.   Stated reasons for entering treatment/primary motivators alexis had enough. just done. before it ruins my life.   Patient's level of awareness of the relationship between behavioral conditions and pattern of substance abuse Some insight   Current withdrawal symptoms Anxiety;Tremors   Patient has experienced DTs No   Patient has attended AA/NA No   Patient has sober support system No   Patient has a sponsor Yes  1   When was patient's last drink? last night.   Longest period of sobriety? not sure.   Patient rated craving level 6   Has patient ever accidentally overdosed on drugs? No     Psychosocial Assessment - 01/30/25 0700    General Appearance WDL   General Appearance WDL X;appearance   General Appearance unkempt   Activity WDL   Activity WDL ex;activity   Activity restlessness   Daily Functioning WDL   Daily functioning WDL daily functioning   Daily functioning exceptions poor appetite   Speech WDL   Speech WDL WDL   Attitude WDL   Attitude WDL attitude   Attitude exceptions dependent   Thought Process WDL   Thought Process WDL thought content   Thought Content helplessness   Perceptual State WDL   Perceptual State WDL WDL   Emotion Mood WDL   Emotion/Mood/Affect WDL emotion mood;X   Emotion/Mood anxious   Patient rated depression level 5   Patient rated anxiety level 4   Cognitive Function WDL    Cognitive function WDL WDL   Functional Status   Usual Activity Tolerance good   Current Activity Tolerance fair     Safety - 01/30/25 0701    Is patient prone to violence? No   Does patient have a history of violent behavior? No   Has patient ever set fires or been accused of setting fires? No   What techniques/methods or tools helped patient to control behavior in the past drink   Does patient have relatives working in the facility? No   Does patient know other patients in the facility? No   Who does patient want notified if they are restrained/secluded? no one   Release signed for notification? No   Does patient have any medical conditions/disabilities/limitations that would place them at greater risk during restraint or seclusion? No     Plan - 01/30/25 0701    Assessment completed Yes   Does  have any reason to believe information obtained is either inaccurate, incomplete or inconsistent? No   Plan Admit to CD   Reviewed rights/rules with patient Yes   Informed patient they must participate in group/therapy sessions even if feeling discomfort due to withdrawal symptoms Yes   Informed patient they may not leave unit unless accompanied by staff Yes   Consent signed for previous treatment/school/legal authorities/etc. Yes      Social History   Tobacco History    Smoking Status  Every Day Average Packs/Day  1 pack/day for 30.0 years (30.0 ttl pk-yrs) Smoking Tobacco Type  Cigarettes   Pack Year History  Packs/Day From To Years      30.0   1   30.0   1   0.0   Smokeless Tobacco Use  Current Smokeless Tobacco Type  Snuff   Alcohol History    Alcohol Use Status  Yes   Drug Use    Drug Use Status  Yes Comment  etoh   Sexual Activity    Sexually Active  Yes Partners  Female Birth Control/Protection  None   Other Factors    Not Asked    E-cigarette/Vaping    Questions Responses   E-cigarette/Vaping Use Current Some Day User      E-cigarette/Vaping Substances    Questions Responses   Nicotine Yes   Flavoring Yes       E-cigarette/Vaping Devices    Questions Responses   Disposable Yes      Other Drug Use    Questions Responses   Age of first use etoh- 41   Usual amount I can drink six to 12 pints of beer. and rarely will have a shot of whiskey   Frequency Daily   Method of use Other   How long at this rate many years. at least three non stop.   Last use 1/29/25     Family History    No family history on file.

## 2025-01-30 NOTE — NURSING NOTE
Blood pressure 160/103, heart rate 100 - Dr. Miller on unit and made aware. No orders at this time.

## 2025-01-30 NOTE — PLAN OF CARE
Goal Outcome Evaluation:  Plan of Care Reviewed With: patient  Patient Agreement with Plan of Care: agrees    New admit to detox for alcohol withdrawal. Librium detox initiated.     Pt has remained alert, and cooperative throughout the shift. Pt appears anxious and nervous quite often. Pt noted to have tremors.    Pt has received Librium PAS x1, and Librium scheduled x2 doses, and zofran with improvement of symptoms. Troponin and EKG wnl. Pt states improvement of symptoms. Declined analgesic meds for discomfort.

## 2025-01-30 NOTE — H&P
INITIAL PSYCHIATRIC HISTORY & PHYSICAL    Patient Identification:  Name:  Ron Durbin  Age:  49 y.o.  Sex:  male  :  1975  MRN:  [id number]   Visit Number:  [id number]  Primary Care Physician:  Kristi Conti APRN    SUBJECTIVE    CC/Focus of Exam: Alcohol use and withdrawals    HPI: Ron Durbin is a 49 y.o. male who was admitted on 2025 with complaints of alcohol use and withdrawals. The patient reports a long history of substance use. First use was at age 23 but didn't regularly and at age 41 he started drinking regularly to deal with the stressors of life. Over time the use increased and the patient  continued to use despite negative consequences including relationship problems, social and financial problems. The patient endorses symptoms of tolerance and withdrawals and ongoing cravings to use. Has tried to cut down and stop but has not been successful. Spends too much time and resources in pursuit of substance use. Longest period of sobriety is reported to be a week.  Currently using 6-10 pints of beer daily and then some more.   Last use was yesterday.   Withdrawal symptoms include tremors, sweating, anxiety attacks. The patient was experiencing a lot of withdrawals in the ED and needed a dose of Librium which helped.     PAST PSYCHIATRIC HX: Patient reports he took Wellbutrin recently but it didn't help.     SUBSTANCE USE HX: See HPI.     SOCIAL HX:   Social History     Socioeconomic History    Marital status: Single   Tobacco Use    Smoking status: Every Day     Average packs/day: 1 pack/day for 30.0 years (30.0 ttl pk-yrs)     Types: Cigarettes    Smokeless tobacco: Current     Types: Snuff   Vaping Use    Vaping status: Some Days    Substances: Nicotine, Flavoring    Devices: Disposable   Substance and Sexual Activity    Alcohol use: Yes    Drug use: Yes     Comment: etoh    Sexual activity: Yes     Partners: Female     Birth control/protection: None         Past Medical History:    Diagnosis Date    Alcohol abuse     Anxiety     HLD (hyperlipidemia)     HTN (hypertension)           Past Surgical History:   Procedure Laterality Date    HERNIA REPAIR         History reviewed. No pertinent family history.      No medications prior to admission.         ALLERGIES:  Patient has no known allergies.    Temp:  [97.9 °F (36.6 °C)-98.5 °F (36.9 °C)] 98.4 °F (36.9 °C)  Heart Rate:  [73-94] 87  Resp:  [18] 18  BP: (120-153)/(80-98) 151/92    REVIEW OF SYSTEMS:  Review of Systems   Constitutional:  Positive for diaphoresis and fatigue.   HENT: Negative.     Eyes: Negative.    Respiratory: Negative.     Cardiovascular: Negative.    Gastrointestinal:  Positive for nausea.   Endocrine: Negative.    Genitourinary: Negative.    Musculoskeletal: Negative.    Skin: Negative.    Allergic/Immunologic: Negative.    Neurological:  Positive for tremors and weakness.   Hematological: Negative.    Psychiatric/Behavioral:  The patient is nervous/anxious.         OBJECTIVE    PHYSICAL EXAM:  Physical Exam  Constitutional:  Appears well-developed and well-nourished.   HENT:   Head: Normocephalic and atraumatic.   Right Ear: External ear normal.   Left Ear: External ear normal.   Mouth/Throat: Oropharynx is clear and moist.   Eyes: Pupils are equal, round, and reactive to light. Conjunctivae and EOM are normal.   Neck: Normal range of motion. Neck supple.   Cardiovascular: Normal rate, regular rhythm and normal heart sounds.    Respiratory: Effort normal and breath sounds normal. No respiratory distress. No wheezes.   GI: Soft. Bowel sounds are normal.No distension. There is no tenderness.   Musculoskeletal: Normal range of motion. No edema or deformity.   Neurological:  Cranial Nerves: I. No anosmia. II: No visual disturbance. III, IV VI: EOMI, PERRLA. V: Corneal reflext intact, no abnormal sensations. VII: No facial palsy, or altered sensation. VIII: Hearing intact, balance intact. IX: Intact ah reflex. X: Normal  phonation, swallowing. XI: Normal shrug and head movement. XII: Intact tongue movements  Coordination normal. No lateralizing signs.  Skin: Skin is warm and dry. No rash noted. No erythema.     MENTAL STATUS EXAM:   Hygiene:   fair  Cooperation:  Cooperative  Eye Contact:  Fair  Psychomotor Behavior:  Appropriate  Affect:  Appropriate  Hopelessness: Denies  Speech:  Normal  Thought Process: Goal directed  Thought Content:  Normal  Suicidal:  None  Homicidal:  None  Hallucinations:  None  Delusion:  None  Memory:  Intact  Orientation:  Person, Place, Time and Situation  Reliability:  fair  Insight:  Fair  Judgement:  Fair  Impulse Control:  Fair      Imaging Results (Last 24 Hours)       ** No results found for the last 24 hours. **             ECG/EMG Results (most recent)       Procedure Component Value Units Date/Time    ECG 12 Lead Other; Baseline Cardiac Status [[id number]] Collected: 01/30/25 1003     Updated: 01/30/25 1007     QT Interval 364 ms      QTC Interval 457 ms     Narrative:      Test Reason : Other~  Blood Pressure :   */*   mmHG  Vent. Rate :  95 BPM     Atrial Rate :  95 BPM     P-R Int : 142 ms          QRS Dur :  82 ms      QT Int : 364 ms       P-R-T Axes :  72  69  60 degrees    QTcB Int : 457 ms    Normal sinus rhythm  Normal ECG  No previous ECGs available    Referred By: DANIEL           Confirmed By:              Lab Results   Component Value Date    GLUCOSE 108 (H) 01/29/2025    BUN 6 01/29/2025    CREATININE 0.86 01/29/2025    BCR 7.0 01/29/2025    CO2 21.3 (L) 01/29/2025    CALCIUM 9.3 01/29/2025    ALBUMIN 4.6 01/29/2025    AST 41 (H) 01/29/2025    ALT 25 01/29/2025       Lab Results   Component Value Date    WBC 6.26 01/29/2025    HGB 13.3 01/29/2025    HCT 38.9 01/29/2025    MCV 94.9 01/29/2025     01/29/2025       Last Urine Toxicity          Latest Ref Rng & Units 1/29/2025   LAST URINE TOXICITY RESULTS   Amphetamine, Urine Qual Negative Negative    Barbiturates Screen, Urine  Negative Negative    Benzodiazepine Screen, Urine Negative Negative    Buprenorphine, Screen, Urine Negative Negative    Cocaine Screen, Urine Negative Negative    Fentanyl, Urine Negative Negative    Methadone Screen , Urine Negative Negative    Methamphetamine, Ur Negative Negative       Details                   Brief Urine Lab Results  (Last result in the past 365 days)        Color   Clarity   Blood   Leuk Est   Nitrite   Protein   CREAT   Urine HCG        01/29/25 2344 Yellow   Clear   Negative   Negative   Negative   Negative                     ASSESSMENT & PLAN:    Hospital bed: No      Alcohol use disorder, severe, dependence  -Ativan detox  -Thiamine and folate      HTN (hypertension)  -Lisinopril 40 mg daily      Nicotine use disorder  -Encouraged cessation      The patient has been admitted for safety and stabilization.  Patient will be monitored for suicidality daily and maintained on Special Precautions Level 4 (q30 min checks).  The patient will have individual and group therapy with a master's level therapist. A master treatment plan will be developed and agreed upon by the patient and his/her treatment team.  The patient's estimated length of stay in the hospital is 5-7 days.

## 2025-01-30 NOTE — NURSING NOTE
Pt reported during admission that he has chest tightness of 4/10. Reports that he has been having intermittent chest discomfort for several days and was recently seen at another hospital and workup for heart was negative. Pt reports he feels it is his anxiety. Pt reports tightness as non-radiating and mid-sternal. Denies nausea at this time.     Contacted [facility] for stat EKG.     Notified lead, CHANTEL Sandy.    Spoke with Dr. Miller, via phone - requested chest pain protocol. Troponin level ordered.

## 2025-01-31 PROCEDURE — 99232 SBSQ HOSP IP/OBS MODERATE 35: CPT | Performed by: PSYCHIATRY & NEUROLOGY

## 2025-01-31 RX ORDER — LISINOPRIL 10 MG/1
40 TABLET ORAL
Status: DISCONTINUED | OUTPATIENT
Start: 2025-01-31 | End: 2025-02-02 | Stop reason: HOSPADM

## 2025-01-31 RX ADMIN — CHLORDIAZEPOXIDE HYDROCHLORIDE 25 MG: 25 CAPSULE ORAL at 08:34

## 2025-01-31 RX ADMIN — CHLORDIAZEPOXIDE HYDROCHLORIDE 25 MG: 25 CAPSULE ORAL at 21:26

## 2025-01-31 RX ADMIN — LISINOPRIL 40 MG: 10 TABLET ORAL at 16:47

## 2025-01-31 RX ADMIN — Medication 1 TABLET: at 08:34

## 2025-01-31 RX ADMIN — HYDROXYZINE HYDROCHLORIDE 50 MG: 50 TABLET, FILM COATED ORAL at 21:26

## 2025-01-31 RX ADMIN — Medication 100 MG: at 08:35

## 2025-01-31 RX ADMIN — MAGNESIUM HYDROXIDE 10 ML: 2400 SUSPENSION ORAL at 21:28

## 2025-01-31 RX ADMIN — Medication 2 TABLET: at 08:35

## 2025-01-31 NOTE — PLAN OF CARE
Goal Outcome Evaluation:  Plan of Care Reviewed With: patient  Plan of Care Reviewed With: patient  Patient Agreement with Plan of Care: agrees     Progress: improving  Outcome Evaluation: Pt has been calm and cooperative, rates anxiety 1, depression 0. Pt states sleep is good and appetite is okay. Pt denies SI/HI/AVH. Pt rates cravings 1, denies withdrawal sx.

## 2025-01-31 NOTE — PLAN OF CARE
Goal Outcome Evaluation:  Plan of Care Reviewed With: patient  Plan of Care Reviewed With: patient  Patient Agreement with Plan of Care: agrees     Progress: improving     Pt rates anxiety 2/10, denies cravings, and denies depression. Pt appetite is good for shift and slept well. Pt cooperative with staff and oriented x 4. Pt given Atarax prn medication.

## 2025-01-31 NOTE — PLAN OF CARE
Goal Outcome Evaluation:        Problem: Adult Behavioral Health Plan of Care  Goal: Patient-Specific Goal (Individualization)  Outcome: Progressing  Flowsheets  Taken 1/30/2025 0956  Patient/Family-Specific Goals (Include Timeframe): Patient will identify 2-3 coping skills, complete aftercare plans, address relapse prevention methods, and deny SI/HI prior to discharge in 1-7 days. Patient's long term goal is to maintain sobriety for the next 30 days.  Individualized Care Needs: Therapist to offer 1-4 therapy sessions, aftercare planning, safety planning, group therapy, family education, and brief CBT/MI interventions.  Anxieties, Fears or Concerns: none verbalized  Taken 1/30/2025 0947  Patient Personal Strengths:   resilient   resourceful   motivated for treatment   motivated for recovery   family/social support   socioeconomic stability   positive vocational history   positive educational history   stable living environment   relationship stability  Patient Vulnerabilities:   substance abuse/addiction   poor impulse control   history of unsuccessful treatment  Goal: Optimized Coping Skills in Response to Life Stressors  Outcome: Progressing  Intervention: Promote Effective Coping Strategies  Flowsheets (Taken 1/31/2025 0900)  Supportive Measures:   active listening utilized   counseling provided   decision-making supported   goal-setting facilitated   verbalization of feelings encouraged   self-responsibility promoted   self-reflection promoted   self-care encouraged   relaxation techniques promoted   positive reinforcement provided  Goal: Develops/Participates in Therapeutic [place] to Support Successful Transition  Outcome: Progressing  Intervention: Foster Therapeutic [place]  Flowsheets (Taken 1/31/2025 0900)  Trust Relationship/Rapport:   questions encouraged   care explained   choices provided   emotional support provided   reassurance provided   thoughts/feelings acknowledged   empathic listening  provided   questions answered  Intervention: Mutually Develop Transition Plan  Flowsheets  Taken 1/31/2025 0859  Discharge Coordination/Progress: Patient has [employer] insurance. Patient plans to return home at discharge and has not been agreeable to aftercare.  Transportation Anticipated: family or friend will provide  Transportation Concerns: none  Current Discharge Risk: substance use/abuse  Concerns to be Addressed:   substance/tobacco abuse/use   coping/stress   cognitive/perceptual   mental health   discharge planning  Readmission Within the Last 30 Days: no previous admission in last 30 days  Patient/Family Anticipated Services at Transition: none  Patient's Choice of Community Agency(s): Patient declined.  Patient/Family Anticipates Transition to: home  Offered/Gave Vendor List: yes  Taken 1/30/2025 0929  Outpatient/Agency/Support Group Needs:   residential services   outpatient substance abuse treatment (specify)   outpatient psychiatric care (specify)   outpatient medication management   outpatient counseling   intensive outpatient services  Transition Support:   follow-up care discussed   follow-up care coordinated   community resources reviewed   crisis management plan promoted   crisis management plan verbalized  Anticipated Discharge Disposition: home with family      DATA:  Therapist met with patient individually this date. Patient agreeable to discuss current treatment progress and discharge concerns.     Therapist spoke with patient's girlfriend on this date, provided update and answered all questions. No additional needs or concerns, appears supportive.    CLINICAL MANUVERING/INTERVENTIONS:    Assisted patient in processing session content; acknowledged and normalized patient’s thoughts, feelings, and concerns by utilizing a person-centered approach in efforts to build appropriate rapport and a positive therapeutic relationship with open and honest communication. Allowed patient to ventilate regarding  current stressors and triggers for negative emotions and thoughts in a safe nonjudgmental environment with unconditional positive regard, active listening skills, and empathy. Therapist implemented motivational interviewing techniques to assist patient with exploring personal growth and change and discussed distress tolerance skills, self soothing techniques, and applied cognitive behavioral strategies to facilitate identification of maladaptive patterns of thinking and behavior. Therapist utilized dialectical behavior techniques to teach and model emotional regulation and relaxation methods. Therapist assisted patient with identifying and implementing healthier coping strategies.     ASSESSMENT:  Therapist met with patient on this date, he continues to receive treatment for alcohol detox. Patient reports mild anxiety and depression, denies SI/HI/AVH. Patient experiencing mild fatigue, denies additional withdrawal symptoms. Patient plans to return home when stable and has not been agreeable to aftercare. Family expected to provide transportation. He denies having any additional needs or concerns at this time.     PLAN:   Patient will continue stabilization. Patient will continue to receive services offered by treatment team.     Therapist recommends [facility] residential rehab. Patient plans to return home at discharge and has not been agreeable to aftercare.

## 2025-01-31 NOTE — PROGRESS NOTES
"INPATIENT PSYCHIATRIC PROGRESS NOTE    Name:  Ron Durbin  :  1975  MRN:  [id number]  Visit Number:  [id number]  Length of stay:  1    SUBJECTIVE    CC/Focus of Exam: Alcohol use    INTERVAL HISTORY:  The patient reports he is feeling better. Is feeling tired but the withdrawals are getting better.   Depression rating 2/10  Anxiety rating 3/10  Sleep: good  Withdrawal sx:  None last night.  Cravin/10    Review of Systems   Respiratory: Negative.     Cardiovascular: Negative.    Gastrointestinal: Negative.        OBJECTIVE    Temp:  [97 °F (36.1 °C)-98.6 °F (37 °C)] 98 °F (36.7 °C)  Heart Rate:  [] 94  Resp:  [16-18] 16  BP: (124-160)/() 124/89    MENTAL STATUS EXAM:  Appearance:Casually dressed, good hygeine.   Cooperation:Cooperative  Psychomotor: No psychomotor agitation/retardation, No EPS, No motor tics  Speech-normal rate, amount.  Mood \"better\"   Affect-congruent, appropriate, stable  Thought Content-goal directed, no delusional material present  Thought process-linear, organized.  Suicidality: No SI  Homicidality: No HI  Perception: No AH/VH  Insight-fair   Judgement-fair    Lab Results (last 24 hours)       Procedure Component Value Units Date/Time    High Sensitivity Troponin T 1Hr [[id number]]  (Normal) Collected: 25 105    Specimen: Blood Updated: 25 112     HS Troponin T 7 ng/L      Troponin T Numeric Delta -2 ng/L     Narrative:      High Sensitive Troponin T Reference Range:  <14.0 ng/L- Negative Female for AMI  <22.0 ng/L- Negative Male for AMI  >=14 - Abnormal Female indicating possible myocardial injury.  >=22 - Abnormal Male indicating possible myocardial injury.   Clinicians would have to utilize clinical acumen, EKG, Troponin, and serial changes to determine if it is an Acute Myocardial Infarction or myocardial injury due to an underlying chronic condition.                    Imaging Results (Last 24 Hours)       ** No results found for the last 24 hours. ** "               ECG/EMG Results (most recent)       Procedure Component Value Units Date/Time    ECG 12 Lead Other; Baseline Cardiac Status [[id number]] Collected: 01/30/25 1003     Updated: 01/30/25 1214     QT Interval 364 ms      QTC Interval 457 ms     Narrative:      Test Reason : Other~  Blood Pressure :   */*   mmHG  Vent. Rate :  95 BPM     Atrial Rate :  95 BPM     P-R Int : 142 ms          QRS Dur :  82 ms      QT Int : 364 ms       P-R-T Axes :  72  69  60 degrees    QTcB Int : 457 ms    Normal sinus rhythm  Normal ECG  No previous ECGs available  Confirmed by Larry Nolen ([id number]) on 1/30/2025 12:13:56 PM    Referred By: DANIEL           Confirmed By: Larry Nolen             ALLERGIES: Patient has no known allergies.    Medication Review:   Scheduled Medications:  B-complex with vitamin C, 2 tablet, Oral, Daily  [START ON 2/1/2025] chlordiazePOXIDE, 10 mg, Oral, BID  [START ON 2/2/2025] chlordiazePOXIDE, 10 mg, Oral, Once  chlordiazePOXIDE, 25 mg, Oral, BID  multivitamin with minerals, 1 tablet, Oral, Daily  thiamine, 100 mg, Oral, Daily         PRN Medications:    aluminum-magnesium hydroxide-simethicone    benzonatate    benztropine **OR** benztropine    famotidine    hydrOXYzine    ibuprofen    loperamide    magnesium hydroxide    ondansetron ODT    polyethylene glycol    sodium chloride    traZODone   All medications reviewed.    ASSESSMENT & PLAN:      Alcohol use disorder, severe, dependence  -Ativan detox  -Thiamine and folate       HTN (hypertension)  -Lisinopril 40 mg daily       Nicotine use disorder  -Encouraged cessation    Special precautions: Special Precautions Level 4 (q30 min checks).    Behavioral Health Treatment Plan and Problem List: I have reviewed and approved the Behavioral Health Treatment Plan and Problem list.  The patient has had a chance to review and agrees with the treatment plan.    Copied text in portions of this note has been reviewed and is accurate as of  01/31/25         Clinician:  Melanie Miller MD  01/31/25  11:19 EST

## 2025-02-01 PROCEDURE — 99232 SBSQ HOSP IP/OBS MODERATE 35: CPT | Performed by: PSYCHIATRY & NEUROLOGY

## 2025-02-01 RX ADMIN — CHLORDIAZEPOXIDE HYDROCHLORIDE 10 MG: 10 CAPSULE ORAL at 08:44

## 2025-02-01 RX ADMIN — LISINOPRIL 40 MG: 10 TABLET ORAL at 08:44

## 2025-02-01 RX ADMIN — HYDROXYZINE HYDROCHLORIDE 50 MG: 50 TABLET, FILM COATED ORAL at 21:27

## 2025-02-01 RX ADMIN — Medication 100 MG: at 08:44

## 2025-02-01 RX ADMIN — Medication 2 TABLET: at 08:44

## 2025-02-01 RX ADMIN — CHLORDIAZEPOXIDE HYDROCHLORIDE 10 MG: 10 CAPSULE ORAL at 21:26

## 2025-02-01 RX ADMIN — Medication 1 TABLET: at 08:44

## 2025-02-01 NOTE — PROGRESS NOTES
"INPATIENT PSYCHIATRIC PROGRESS NOTE    Name:  Ron Durbin  :  1975  MRN:  [id number]  Visit Number:  [id number]  Length of stay:  2    SUBJECTIVE    CC/Focus of Exam: Alcohol use    INTERVAL HISTORY:  The patient reports he is feeling better and the withdrawals are improving. He states he is getting his energy back, appetite has been phenomenal, and sleeping good.   Depression rating 2/10  Anxiety rating 3/10  Sleep: good  Withdrawal sx: None  Cravin/10    Review of Systems   Respiratory: Negative.     Cardiovascular: Negative.    Gastrointestinal: Negative.        OBJECTIVE    Temp:  [97 °F (36.1 °C)-98.3 °F (36.8 °C)] 97.3 °F (36.3 °C)  Heart Rate:  [78-99] 84  Resp:  [16-18] 16  BP: (104-152)/() 144/72    MENTAL STATUS EXAM:  Appearance:Casually dressed, good hygeine.   Cooperation:Cooperative  Psychomotor: No psychomotor agitation/retardation, No EPS, No motor tics  Speech-normal rate, amount.  Mood \"better\"   Affect-congruent, appropriate, stable  Thought Content-goal directed, no delusional material present  Thought process-linear, organized.  Suicidality: No SI  Homicidality: No HI  Perception: No AH/VH  Insight-fair   Judgement-fair    Lab Results (last 24 hours)       ** No results found for the last 24 hours. **               Imaging Results (Last 24 Hours)       ** No results found for the last 24 hours. **               ECG/EMG Results (most recent)       Procedure Component Value Units Date/Time    ECG 12 Lead Other; Baseline Cardiac Status [[id number]] Collected: 25 1003     Updated: 25 1214     QT Interval 364 ms      QTC Interval 457 ms     Narrative:      Test Reason : Other~  Blood Pressure :   */*   mmHG  Vent. Rate :  95 BPM     Atrial Rate :  95 BPM     P-R Int : 142 ms          QRS Dur :  82 ms      QT Int : 364 ms       P-R-T Axes :  72  69  60 degrees    QTcB Int : 457 ms    Normal sinus rhythm  Normal ECG  No previous ECGs available  Confirmed by Tamanna " Larry BARLOW ([id number]) on 1/30/2025 12:13:56 PM    Referred By: DANIEL           Confirmed By: Larry Nolen             ALLERGIES: Patient has no known allergies.    Medication Review:   Scheduled Medications:  B-complex with vitamin C, 2 tablet, Oral, Daily  chlordiazePOXIDE, 10 mg, Oral, BID  [START ON 2/2/2025] chlordiazePOXIDE, 10 mg, Oral, Once  lisinopril, 40 mg, Oral, Q24H  multivitamin with minerals, 1 tablet, Oral, Daily  thiamine, 100 mg, Oral, Daily         PRN Medications:    aluminum-magnesium hydroxide-simethicone    benzonatate    benztropine **OR** benztropine    famotidine    hydrOXYzine    ibuprofen    loperamide    magnesium hydroxide    ondansetron ODT    polyethylene glycol    sodium chloride    traZODone   All medications reviewed.    ASSESSMENT & PLAN:      Alcohol use disorder, severe, dependence  -Librium detox  -Thiamine and folate       HTN (hypertension)  -Lisinopril 40 mg daily       Nicotine use disorder  -Encouraged cessation    Special precautions: Special Precautions Level 4 (q30 min checks).    Behavioral Health Treatment Plan and Problem List: I have reviewed and approved the Behavioral Health Treatment Plan and Problem list.  The patient has had a chance to review and agrees with the treatment plan.    Copied text in portions of this note has been reviewed and is accurate as of 02/01/25         Clinician:  Melanie Miller MD  02/01/25  11:07 EST

## 2025-02-01 NOTE — PLAN OF CARE
Goal Outcome Evaluation:  Plan of Care Reviewed With: patient  Plan of Care Reviewed With: patient  Patient Agreement with Plan of Care: agrees     Progress: improving  Outcome Evaluation: Pt reported depression 0/10, anxiety 1/10, craving 0/10. Denies SI/HI/AVH. No prn meds required this shift.

## 2025-02-01 NOTE — PLAN OF CARE
Goal Outcome Evaluation:  Plan of Care Reviewed With: patient  Plan of Care Reviewed With: patient  Patient Agreement with Plan of Care: agrees     Progress: improving        Pt rates anxiety 2/10, denies depression, and denies cravings. Pt appetite is good for shift. Pt slept well. Given Atarax and Milk of Magnesia prn medications.

## 2025-02-02 VITALS
HEIGHT: 70 IN | DIASTOLIC BLOOD PRESSURE: 93 MMHG | BODY MASS INDEX: 23.39 KG/M2 | RESPIRATION RATE: 18 BRPM | SYSTOLIC BLOOD PRESSURE: 128 MMHG | HEART RATE: 86 BPM | TEMPERATURE: 97 F | OXYGEN SATURATION: 98 % | WEIGHT: 163.4 LBS

## 2025-02-02 PROCEDURE — 99238 HOSP IP/OBS DSCHRG MGMT 30/<: CPT | Performed by: PSYCHIATRY & NEUROLOGY

## 2025-02-02 RX ORDER — HYDROXYZINE HYDROCHLORIDE 25 MG/1
50 TABLET, FILM COATED ORAL EVERY 8 HOURS PRN
Qty: 90 TABLET | Refills: 0 | Status: SHIPPED | OUTPATIENT
Start: 2025-02-02

## 2025-02-02 RX ORDER — LISINOPRIL 40 MG/1
40 TABLET ORAL
Qty: 30 TABLET | Refills: 0 | Status: SHIPPED | OUTPATIENT
Start: 2025-02-03

## 2025-02-02 RX ADMIN — LISINOPRIL 40 MG: 10 TABLET ORAL at 08:17

## 2025-02-02 RX ADMIN — CHLORDIAZEPOXIDE HYDROCHLORIDE 10 MG: 10 CAPSULE ORAL at 08:17

## 2025-02-02 RX ADMIN — Medication 2 TABLET: at 08:17

## 2025-02-02 RX ADMIN — Medication 100 MG: at 08:17

## 2025-02-02 RX ADMIN — Medication 1 TABLET: at 08:17

## 2025-02-02 RX ADMIN — HYDROXYZINE HYDROCHLORIDE 50 MG: 50 TABLET, FILM COATED ORAL at 08:18

## 2025-02-02 NOTE — PLAN OF CARE
Goal Outcome Evaluation:  Plan of Care Reviewed With: patient  Plan of Care Reviewed With: patient  Patient Agreement with Plan of Care: agrees     Progress: improving  Outcome Evaluation: Pt discharged home with outpatient.

## 2025-02-02 NOTE — PLAN OF CARE
Goal Outcome Evaluation:  Plan of Care Reviewed With: patient  Plan of Care Reviewed With: patient  Patient Agreement with Plan of Care: agrees     Progress: improving  Outcome Evaluation: Pt rated anxiety 0/10, depression 0/10, and cravings 0/10. Pt denies SI/HI/AVH. Pt was given prn atarax. Pt did not voice any other concerns at this time. No acute s/s of distress noted.

## 2025-02-02 NOTE — DISCHARGE SUMMARY
":  1975  MRN:  [id number]  Visit Number:  [id number]      Date of Admission:2025   Date of Discharge:  2025    Discharge Diagnosis:  Principal Problem:    Alcohol use disorder, severe, dependence  Active Problems:    HTN (hypertension)    Nicotine use disorder        Admission Diagnosis:  Alcohol abuse [F10.10]     HPI  Ron Durbin is a 49 y.o. male who was admitted on 2025 with complaints of alcohol use and withdrawals.   For details please see H&P dated 25.     Hospital Course  Patient is a 49 y.o. male presented with alcohol use and withdrawals. . The patient was admitted to the [facility] detox recovery unit for safety, further evaluation and treatment.  The patient was started on Librium detox and was able to complete it without any complications.  The patient was resumed on lisinopril 40 mg daily for hypertension.  The patient was also able to take part in individual and group counseling sessions and work on appropriate coping skills.  The patient made steady improvement in his withdrawals and mood and expressed feeling more positive and hopeful about future. Sleep and appetite were improved.  The day of discharge the patient was calm, cooperative and pleasant. Mood was reported to be good, and denied SI/HI/AVH. Also reported no medication side effects.        Mental Status Exam upon discharge:   Mood \"good\"   Affect-congruent, appropriate, stable  Thought Content-goal directed, no delusional material present  Thought process-linear, organized.  Suicidality: No SI  Homicidality: No HI  Perception: No AH/VH    Procedures Performed         Consults:   Consults       No orders found from 2025 to 2025.            Pertinent Test Results:   Admission on 2025   Component Date Value Ref Range Status    Hepatitis B Surface Ag 2025 Non-Reactive  Non-Reactive Final    Hep A IgM 2025 Non-Reactive  Non-Reactive Final    Hep B C IgM 2025 Non-Reactive  " Non-Reactive Final    Hepatitis C Ab 01/29/2025 Non-Reactive  Non-Reactive Final    QT Interval 01/30/2025 364  ms Final    QTC Interval 01/30/2025 457  ms Final    HS Troponin T 01/30/2025 9  <22 ng/L Final    HS Troponin T 01/30/2025 7  <22 ng/L Final    Troponin T Numeric Delta 01/30/2025 -2  Abnormal if >/=3 ng/L Final   Admission on 01/30/2025, Discharged on 01/30/2025   Component Date Value Ref Range Status    Glucose 01/29/2025 108 (H)  65 - 99 mg/dL Final    BUN 01/29/2025 6  6 - 20 mg/dL Final    Creatinine 01/29/2025 0.86  0.76 - 1.27 mg/dL Final    Sodium 01/29/2025 134 (L)  136 - 145 mmol/L Final    Potassium 01/29/2025 3.7  3.5 - 5.2 mmol/L Final    Chloride 01/29/2025 94 (L)  98 - 107 mmol/L Final    CO2 01/29/2025 21.3 (L)  22.0 - 29.0 mmol/L Final    Calcium 01/29/2025 9.3  8.6 - 10.5 mg/dL Final    Total Protein 01/29/2025 7.3  6.0 - 8.5 g/dL Final    Albumin 01/29/2025 4.6  3.5 - 5.2 g/dL Final    ALT (SGPT) 01/29/2025 25  1 - 41 U/L Final    AST (SGOT) 01/29/2025 41 (H)  1 - 40 U/L Final    Alkaline Phosphatase 01/29/2025 64  39 - 117 U/L Final    Total Bilirubin 01/29/2025 0.5  0.0 - 1.2 mg/dL Final    Globulin 01/29/2025 2.7  gm/dL Final    A/G Ratio 01/29/2025 1.7  g/dL Final    BUN/Creatinine Ratio 01/29/2025 7.0  7.0 - 25.0 Final    Anion Gap 01/29/2025 18.7 (H)  5.0 - 15.0 mmol/L Final    eGFR 01/29/2025 106.8  >60.0 mL/min/1.73 Final    Ethanol 01/29/2025 227 (H)  0 - 10 mg/dL Final    Ethanol % 01/29/2025 0.227  % Final    Color, UA 01/29/2025 Yellow  Yellow, Straw Final    Appearance, UA 01/29/2025 Clear  Clear Final    pH, UA 01/29/2025 6.0  5.0 - 8.0 Final    Specific Gravity, UA 01/29/2025 <=1.005  1.005 - 1.030 Final    Glucose, UA 01/29/2025 Negative  Negative Final    Ketones, UA 01/29/2025 Negative  Negative Final    Bilirubin, UA 01/29/2025 Negative  Negative Final    Blood, UA 01/29/2025 Negative  Negative Final    Protein, UA 01/29/2025 Negative  Negative Final    Leuk Esterase,  UA 01/29/2025 Negative  Negative Final    Nitrite, UA 01/29/2025 Negative  Negative Final    Urobilinogen, UA 01/29/2025 0.2 E.U./dL  0.2 - 1.0 E.U./dL Final    THC, Screen, Urine 01/29/2025 Negative  Negative Final    Phencyclidine (PCP), Urine 01/29/2025 Negative  Negative Final    Cocaine Screen, Urine 01/29/2025 Negative  Negative Final    Methamphetamine, Ur 01/29/2025 Negative  Negative Final    Opiate Screen 01/29/2025 Negative  Negative Final    Amphetamine Screen, Urine 01/29/2025 Negative  Negative Final    Benzodiazepine Screen, Urine 01/29/2025 Negative  Negative Final    Tricyclic Antidepressants Screen 01/29/2025 Negative  Negative Final    Methadone Screen, Urine 01/29/2025 Negative  Negative Final    Barbiturates Screen, Urine 01/29/2025 Negative  Negative Final    Oxycodone Screen, Urine 01/29/2025 Negative  Negative Final    Buprenorphine, Screen, Urine 01/29/2025 Negative  Negative Final    Fentanyl, Urine 01/29/2025 Negative  Negative Final    Extra Tube 01/29/2025 Hold for add-ons.   Final    Auto resulted.    Extra Tube 01/29/2025 hold for add-on   Final    Auto resulted    Extra Tube 01/29/2025 Hold for add-ons.   Final    Auto resulted.    Extra Tube 01/29/2025 Hold for add-ons.   Final    Auto resulted    WBC 01/29/2025 6.26  3.40 - 10.80 10*3/mm3 Final    RBC 01/29/2025 4.10 (L)  4.14 - 5.80 10*6/mm3 Final    Hemoglobin 01/29/2025 13.3  13.0 - 17.7 g/dL Final    Hematocrit 01/29/2025 38.9  37.5 - 51.0 % Final    MCV 01/29/2025 94.9  79.0 - 97.0 fL Final    MCH 01/29/2025 32.4  26.6 - 33.0 pg Final    MCHC 01/29/2025 34.2  31.5 - 35.7 g/dL Final    RDW 01/29/2025 12.4  12.3 - 15.4 % Final    RDW-SD 01/29/2025 42.5  37.0 - 54.0 fl Final    MPV 01/29/2025 10.1  6.0 - 12.0 fL Final    Platelets 01/29/2025 171  140 - 450 10*3/mm3 Final    Neutrophil % 01/29/2025 58.8  42.7 - 76.0 % Final    Lymphocyte % 01/29/2025 28.4  19.6 - 45.3 % Final    Monocyte % 01/29/2025 9.6  5.0 - 12.0 % Final     Eosinophil % 01/29/2025 2.1  0.3 - 6.2 % Final    Basophil % 01/29/2025 0.8  0.0 - 1.5 % Final    Immature Grans % 01/29/2025 0.3  0.0 - 0.5 % Final    Neutrophils, Absolute 01/29/2025 3.68  1.70 - 7.00 10*3/mm3 Final    Lymphocytes, Absolute 01/29/2025 1.78  0.70 - 3.10 10*3/mm3 Final    Monocytes, Absolute 01/29/2025 0.60  0.10 - 0.90 10*3/mm3 Final    Eosinophils, Absolute 01/29/2025 0.13  0.00 - 0.40 10*3/mm3 Final    Basophils, Absolute 01/29/2025 0.05  0.00 - 0.20 10*3/mm3 Final    Immature Grans, Absolute 01/29/2025 0.02  0.00 - 0.05 10*3/mm3 Final    nRBC 01/29/2025 0.0  0.0 - 0.2 /100 WBC Final    Ethanol 01/30/2025 120 (H)  0 - 10 mg/dL Final    Ethanol % 01/30/2025 0.120  % Final    Ethanol 01/30/2025 88 (H)  0 - 10 mg/dL Final    Ethanol % 01/30/2025 0.088  % Final    Magnesium 01/30/2025 2.2  1.6 - 2.6 mg/dL Final        Condition on Discharge:  improved    Vital Signs  Temp:  [97 °F (36.1 °C)-98 °F (36.7 °C)] 97 °F (36.1 °C)  Heart Rate:  [75-99] 86  Resp:  [18] 18  BP: (128-150)/(72-96) 128/93      Discharge Disposition:  Home or Self Care    Discharge Medications:     Discharge Medications        New Medications        Instructions Start Date   hydrOXYzine 25 MG tablet  Commonly known as: ATARAX   50 mg, Oral, Every 8 Hours PRN      lisinopril 40 MG tablet  Commonly known as: PRINIVIL,ZESTRIL   40 mg, Oral, Every 24 Hours Scheduled   Start Date: February 3, 2025              Discharge Diet: Regular     Activity at Discharge: As tolerated     Follow-up Appointments  Patient declined.      Time: I spent  < 30  minutes on this discharge activity which included: face-to-face encounter with the patient, reviewing the data in the system, coordination of the care with the nursing staff as well as consultants, documentation, and entering orders.        Clinician:   Melanie Miller MD  02/02/25  12:55 EST

## 2025-02-03 NOTE — PAYOR COMM NOTE
"Reji Mooney (49 y.o. Male)       Date of Birth   1975    Social Security Number       Address   [address] [place] [address]    Home Phone   [phone]    MRN   [id number]       Spiritism   None    Marital Status   Single                            Admission Date   25    Admission Type   Emergency    Admitting Provider   Melanie Sanchez MD    Attending Provider       Department, Room/Bed   [facility] ADULT CD, 1044/1S       Discharge Date   2025    Discharge Disposition   Home or Self Care    Discharge Destination                                 Attending Provider: (none)   Allergies: No Known Allergies    Isolation: None   Infection: None   Code Status: Prior    Ht: 177.8 cm (70\")   Wt: 74.1 kg (163 lb 6.4 oz)    Admission Cmt: None   Principal Problem: Alcohol use disorder, severe, dependence [F10.20]                   Active Insurance as of 2025       Primary Coverage       Payor Plan Insurance Group Employer/Plan Group    [id number]       Payor Plan Address Payor Plan Phone Number Payor Plan Fax Number Effective Dates    [address] [address] [phone]  2022 - None Entered    [facility] [id number]         Subscriber Name Subscriber Birth Date Member ID       REJI MOONEY 1975 [id number]                     Emergency Contacts        (Rel.) Home Phone Work Phone Mobile Phone    PEGGY REYES (Significant Other) [phone] -- --          PLEASE ATTACH THIS DISCHARGE INFORMATION TO AUTHORIZATION # [id number]    RETURN FAX:  [phone]    RE:  REJI MOONEY  :  1975    Date of Admission:2025   Date of Discharge:  2025      FACILITY:  [facility] (NPI [id number])  ADDRESS:  [address] [place] [address]  ATTENDING MD:  DR MELANIE SANCHEZ (NPI [id number])  ADDRESS IS SAME AS FACILITY     UR CONTACT:  TERESA YAÑEZ RN  PHONE:  [phone]  FAX:  [phone]     Discharge Diagnosis:  Principal Problem:    " "Alcohol use disorder, severe, dependence (F10.20)  Active Problems:    HTN (hypertension)    Nicotine use disorder     DISCHARGE SUMMARY:    Melanie Miller MD   Physician  Psychiatry     Discharge Summary      Addendum     Date of Service: 25  Creation Time: 25       :  1975  MRN:  [id number]  Visit Number:  [id number]        Date of Admission:2025   Date of Discharge:  2025     Discharge Diagnosis:  Principal Problem:    Alcohol use disorder, severe, dependence  Active Problems:    HTN (hypertension)    Nicotine use disorder           Admission Diagnosis:  Alcohol abuse [F10.10]                KAYLI Durbin is a 49 y.o. male who was admitted on 2025 with complaints of alcohol use and withdrawals.   For details please see H&P dated 25.      Hospital Course  Patient is a 49 y.o. male presented with alcohol use and withdrawals. . The patient was admitted to the [facility] detox recovery unit for safety, further evaluation and treatment.  The patient was started on Librium detox and was able to complete it without any complications.  The patient was resumed on lisinopril 40 mg daily for hypertension.  The patient was also able to take part in individual and group counseling sessions and work on appropriate coping skills.  The patient made steady improvement in his withdrawals and mood and expressed feeling more positive and hopeful about future. Sleep and appetite were improved.  The day of discharge the patient was calm, cooperative and pleasant. Mood was reported to be good, and denied SI/HI/AVH. Also reported no medication side effects.         Mental Status Exam upon discharge:   Mood \"good\"   Affect-congruent, appropriate, stable  Thought Content-goal directed, no delusional material present  Thought process-linear, organized.  Suicidality: No SI  Homicidality: No HI  Perception: No AH/VH     Procedures Performed        Consults:   Consults         No " orders found from 1/1/2025 to 1/31/2025.                Pertinent Test Results:           Admission on 01/30/2025   Component Date Value Ref Range Status    Hepatitis B Surface Ag 01/29/2025 Non-Reactive  Non-Reactive Final    Hep A IgM 01/29/2025 Non-Reactive  Non-Reactive Final    Hep B C IgM 01/29/2025 Non-Reactive  Non-Reactive Final    Hepatitis C Ab 01/29/2025 Non-Reactive  Non-Reactive Final    QT Interval 01/30/2025 364  ms Final    QTC Interval 01/30/2025 457  ms Final    HS Troponin T 01/30/2025 9  <22 ng/L Final    HS Troponin T 01/30/2025 7  <22 ng/L Final    Troponin T Numeric Delta 01/30/2025 -2  Abnormal if >/=3 ng/L Final   Admission on 01/30/2025, Discharged on 01/30/2025   Component Date Value Ref Range Status    Glucose 01/29/2025 108 (H)  65 - 99 mg/dL Final    BUN 01/29/2025 6  6 - 20 mg/dL Final    Creatinine 01/29/2025 0.86  0.76 - 1.27 mg/dL Final    Sodium 01/29/2025 134 (L)  136 - 145 mmol/L Final    Potassium 01/29/2025 3.7  3.5 - 5.2 mmol/L Final    Chloride 01/29/2025 94 (L)  98 - 107 mmol/L Final    CO2 01/29/2025 21.3 (L)  22.0 - 29.0 mmol/L Final    Calcium 01/29/2025 9.3  8.6 - 10.5 mg/dL Final    Total Protein 01/29/2025 7.3  6.0 - 8.5 g/dL Final    Albumin 01/29/2025 4.6  3.5 - 5.2 g/dL Final    ALT (SGPT) 01/29/2025 25  1 - 41 U/L Final    AST (SGOT) 01/29/2025 41 (H)  1 - 40 U/L Final    Alkaline Phosphatase 01/29/2025 64  39 - 117 U/L Final    Total Bilirubin 01/29/2025 0.5  0.0 - 1.2 mg/dL Final    Globulin 01/29/2025 2.7  gm/dL Final    A/G Ratio 01/29/2025 1.7  g/dL Final    BUN/Creatinine Ratio 01/29/2025 7.0  7.0 - 25.0 Final    Anion Gap 01/29/2025 18.7 (H)  5.0 - 15.0 mmol/L Final    eGFR 01/29/2025 106.8  >60.0 mL/min/1.73 Final    Ethanol 01/29/2025 227 (H)  0 - 10 mg/dL Final    Ethanol % 01/29/2025 0.227  % Final    Color, UA 01/29/2025 Yellow  Yellow, Straw Final    Appearance, UA 01/29/2025 Clear  Clear Final    pH, UA 01/29/2025 6.0  5.0 - 8.0 Final    Specific  Gravity, UA 01/29/2025 <=1.005  1.005 - 1.030 Final    Glucose, UA 01/29/2025 Negative  Negative Final    Ketones, UA 01/29/2025 Negative  Negative Final    Bilirubin, UA 01/29/2025 Negative  Negative Final    Blood, UA 01/29/2025 Negative  Negative Final    Protein, UA 01/29/2025 Negative  Negative Final    Leuk Esterase, UA 01/29/2025 Negative  Negative Final    Nitrite, UA 01/29/2025 Negative  Negative Final    Urobilinogen, UA 01/29/2025 0.2 E.U./dL  0.2 - 1.0 E.U./dL Final    THC, Screen, Urine 01/29/2025 Negative  Negative Final    Phencyclidine (PCP), Urine 01/29/2025 Negative  Negative Final    Cocaine Screen, Urine 01/29/2025 Negative  Negative Final    Methamphetamine, Ur 01/29/2025 Negative  Negative Final    Opiate Screen 01/29/2025 Negative  Negative Final    Amphetamine Screen, Urine 01/29/2025 Negative  Negative Final    Benzodiazepine Screen, Urine 01/29/2025 Negative  Negative Final    Tricyclic Antidepressants Screen 01/29/2025 Negative  Negative Final    Methadone Screen, Urine 01/29/2025 Negative  Negative Final    Barbiturates Screen, Urine 01/29/2025 Negative  Negative Final    Oxycodone Screen, Urine 01/29/2025 Negative  Negative Final    Buprenorphine, Screen, Urine 01/29/2025 Negative  Negative Final    Fentanyl, Urine 01/29/2025 Negative  Negative Final    Extra Tube 01/29/2025 Hold for add-ons.    Final     Auto resulted.    Extra Tube 01/29/2025 hold for add-on    Final     Auto resulted    Extra Tube 01/29/2025 Hold for add-ons.    Final     Auto resulted.    Extra Tube 01/29/2025 Hold for add-ons.    Final     Auto resulted    WBC 01/29/2025 6.26  3.40 - 10.80 10*3/mm3 Final    RBC 01/29/2025 4.10 (L)  4.14 - 5.80 10*6/mm3 Final    Hemoglobin 01/29/2025 13.3  13.0 - 17.7 g/dL Final    Hematocrit 01/29/2025 38.9  37.5 - 51.0 % Final    MCV 01/29/2025 94.9  79.0 - 97.0 fL Final    MCH 01/29/2025 32.4  26.6 - 33.0 pg Final    MCHC 01/29/2025 34.2  31.5 - 35.7 g/dL Final    RDW 01/29/2025  12.4  12.3 - 15.4 % Final    RDW-SD 01/29/2025 42.5  37.0 - 54.0 fl Final    MPV 01/29/2025 10.1  6.0 - 12.0 fL Final    Platelets 01/29/2025 171  140 - 450 10*3/mm3 Final    Neutrophil % 01/29/2025 58.8  42.7 - 76.0 % Final    Lymphocyte % 01/29/2025 28.4  19.6 - 45.3 % Final    Monocyte % 01/29/2025 9.6  5.0 - 12.0 % Final    Eosinophil % 01/29/2025 2.1  0.3 - 6.2 % Final    Basophil % 01/29/2025 0.8  0.0 - 1.5 % Final    Immature Grans % 01/29/2025 0.3  0.0 - 0.5 % Final    Neutrophils, Absolute 01/29/2025 3.68  1.70 - 7.00 10*3/mm3 Final    Lymphocytes, Absolute 01/29/2025 1.78  0.70 - 3.10 10*3/mm3 Final    Monocytes, Absolute 01/29/2025 0.60  0.10 - 0.90 10*3/mm3 Final    Eosinophils, Absolute 01/29/2025 0.13  0.00 - 0.40 10*3/mm3 Final    Basophils, Absolute 01/29/2025 0.05  0.00 - 0.20 10*3/mm3 Final    Immature Grans, Absolute 01/29/2025 0.02  0.00 - 0.05 10*3/mm3 Final    nRBC 01/29/2025 0.0  0.0 - 0.2 /100 WBC Final    Ethanol 01/30/2025 120 (H)  0 - 10 mg/dL Final    Ethanol % 01/30/2025 0.120  % Final    Ethanol 01/30/2025 88 (H)  0 - 10 mg/dL Final    Ethanol % 01/30/2025 0.088  % Final    Magnesium 01/30/2025 2.2  1.6 - 2.6 mg/dL Final         Condition on Discharge:  improved     Vital Signs  Temp:  [97 °F (36.1 °C)-98 °F (36.7 °C)] 97 °F (36.1 °C)  Heart Rate:  [75-99] 86  Resp:  [18] 18  BP: (128-150)/(72-96) 128/93        Discharge Disposition:  Home or Self Care     Discharge Medications:      Discharge Medications          New Medications         Instructions Start Date   hydrOXYzine 25 MG tablet  Commonly known as: ATARAX    50 mg, Oral, Every 8 Hours PRN        lisinopril 40 MG tablet  Commonly known as: PRINIVIL,ZESTRIL    40 mg, Oral, Every 24 Hours Scheduled    Start Date: February 3, 2025                   Discharge Diet: Regular      Activity at Discharge: As tolerated      Follow-up Appointments  Patient declined.        Time: I spent  < 30  minutes on this discharge activity which  included: face-to-face encounter with the patient, reviewing the data in the system, coordination of the care with the nursing staff as well as consultants, documentation, and entering orders.          Clinician:   Melanie Miller MD  02/02/25  12:55 EST        Revision History